# Patient Record
Sex: MALE | URBAN - METROPOLITAN AREA
[De-identification: names, ages, dates, MRNs, and addresses within clinical notes are randomized per-mention and may not be internally consistent; named-entity substitution may affect disease eponyms.]

---

## 2018-01-09 ENCOUNTER — APPOINTMENT (RX ONLY)
Dept: URBAN - METROPOLITAN AREA CLINIC 4 | Facility: CLINIC | Age: 23
Setting detail: DERMATOLOGY
End: 2018-01-09

## 2018-01-09 DIAGNOSIS — D485 NEOPLASM OF UNCERTAIN BEHAVIOR OF SKIN: ICD-10-CM

## 2018-01-09 PROBLEM — D48.5 NEOPLASM OF UNCERTAIN BEHAVIOR OF SKIN: Status: ACTIVE | Noted: 2018-01-09

## 2018-01-09 PROCEDURE — 11100: CPT

## 2018-01-09 PROCEDURE — ? BIOPSY BY SHAVE METHOD

## 2018-01-09 ASSESSMENT — LOCATION SIMPLE DESCRIPTION DERM: LOCATION SIMPLE: RIGHT CHEEK

## 2018-01-09 ASSESSMENT — LOCATION ZONE DERM: LOCATION ZONE: FACE

## 2018-01-09 ASSESSMENT — LOCATION DETAILED DESCRIPTION DERM: LOCATION DETAILED: RIGHT INFERIOR CENTRAL MALAR CHEEK

## 2018-01-09 NOTE — HPI: SKIN LESION
Is This A New Presentation, Or A Follow-Up?: Skin Lesion
How Severe Is Your Skin Lesion?: moderate
Has Your Skin Lesion Been Treated?: not been treated
Additional History: Lesion on right cheek for two months.

## 2018-01-09 NOTE — PROCEDURE: BIOPSY BY SHAVE METHOD
Render Post-Care Instructions In Note?: no
Hemostasis: Aluminum Chloride and Electrocautery
Wound Care: Vaseline
Electrodesiccation Text: The wound bed was treated with electrodesiccation after the biopsy was performed.
Consent: Written consent was obtained and risks were reviewed including but not limited to scarring, infection, bleeding, scabbing, incomplete removal, nerve damage and allergy to anesthesia.
Size Of Lesion In Cm: 0
Post-Care Instructions: I reviewed with the patient in detail post-care instructions. Patient is to keep the biopsy site dry overnight, and then apply bacitracin twice daily until healed. Patient may apply hydrogen peroxide soaks to remove any crusting.
Type Of Destruction Used: Electrodesiccation
Notification Instructions: Patient will be notified of biopsy results. However, patient instructed to call the office if not contacted within 2 weeks.
Curettage Text: The wound bed was treated with curettage after the biopsy was performed.
Detail Level: Detailed
Silver Nitrate Text: The wound bed was treated with silver nitrate after the biopsy was performed.
Electrodesiccation And Curettage Text: The wound bed was treated with electrodesiccation and curettage after the biopsy was performed.
Billing Type: Third-Party Bill
Lab Facility: 
Cryotherapy Text: The wound bed was treated with cryotherapy after the biopsy was performed.
Dressing: bandage
Lab: [id number]
Anesthesia Type: 1% lidocaine with epinephrine
Biopsy Type: H and E
Biopsy Method: Personna blade
Anesthesia Volume In Cc: 2
Destruction After The Procedure: Yes

## 2023-05-06 ENCOUNTER — HOSPITAL ENCOUNTER (EMERGENCY)
Facility: MEDICAL CENTER | Age: 28
End: 2023-05-06
Attending: EMERGENCY MEDICINE
Payer: COMMERCIAL

## 2023-05-06 VITALS
OXYGEN SATURATION: 98 % | HEIGHT: 69 IN | DIASTOLIC BLOOD PRESSURE: 72 MMHG | RESPIRATION RATE: 18 BRPM | WEIGHT: 160.05 LBS | TEMPERATURE: 97.7 F | BODY MASS INDEX: 23.71 KG/M2 | SYSTOLIC BLOOD PRESSURE: 124 MMHG | HEART RATE: 88 BPM

## 2023-05-06 DIAGNOSIS — J02.0 STREP PHARYNGITIS: ICD-10-CM

## 2023-05-06 DIAGNOSIS — E86.0 DEHYDRATION: ICD-10-CM

## 2023-05-06 DIAGNOSIS — J02.9 SORE THROAT: ICD-10-CM

## 2023-05-06 LAB
ALBUMIN SERPL BCP-MCNC: 4.7 G/DL (ref 3.2–4.9)
ALBUMIN/GLOB SERPL: 1.5 G/DL
ALP SERPL-CCNC: 89 U/L (ref 30–99)
ALT SERPL-CCNC: 19 U/L (ref 2–50)
ANION GAP SERPL CALC-SCNC: 15 MMOL/L (ref 7–16)
AST SERPL-CCNC: 21 U/L (ref 12–45)
BASOPHILS # BLD AUTO: 0.4 % (ref 0–1.8)
BASOPHILS # BLD: 0.02 K/UL (ref 0–0.12)
BILIRUB SERPL-MCNC: 0.6 MG/DL (ref 0.1–1.5)
BUN SERPL-MCNC: 16 MG/DL (ref 8–22)
CALCIUM ALBUM COR SERPL-MCNC: 8.8 MG/DL (ref 8.5–10.5)
CALCIUM SERPL-MCNC: 9.4 MG/DL (ref 8.4–10.2)
CHLORIDE SERPL-SCNC: 99 MMOL/L (ref 96–112)
CO2 SERPL-SCNC: 22 MMOL/L (ref 20–33)
CREAT SERPL-MCNC: 0.9 MG/DL (ref 0.5–1.4)
EOSINOPHIL # BLD AUTO: 0.08 K/UL (ref 0–0.51)
EOSINOPHIL NFR BLD: 1.5 % (ref 0–6.9)
ERYTHROCYTE [DISTWIDTH] IN BLOOD BY AUTOMATED COUNT: 37 FL (ref 35.9–50)
FLUAV RNA SPEC QL NAA+PROBE: NEGATIVE
FLUBV RNA SPEC QL NAA+PROBE: NEGATIVE
GFR SERPLBLD CREATININE-BSD FMLA CKD-EPI: 120 ML/MIN/1.73 M 2
GLOBULIN SER CALC-MCNC: 3.1 G/DL (ref 1.9–3.5)
GLUCOSE SERPL-MCNC: 95 MG/DL (ref 65–99)
HCT VFR BLD AUTO: 44.2 % (ref 42–52)
HETEROPH AB SER QL: NEGATIVE
HGB BLD-MCNC: 16.1 G/DL (ref 14–18)
IMM GRANULOCYTES # BLD AUTO: 0.02 K/UL (ref 0–0.11)
IMM GRANULOCYTES NFR BLD AUTO: 0.4 % (ref 0–0.9)
LYMPHOCYTES # BLD AUTO: 1.22 K/UL (ref 1–4.8)
LYMPHOCYTES NFR BLD: 22.7 % (ref 22–41)
MCH RBC QN AUTO: 32.1 PG (ref 27–33)
MCHC RBC AUTO-ENTMCNC: 36.4 G/DL (ref 33.7–35.3)
MCV RBC AUTO: 88 FL (ref 81.4–97.8)
MONOCYTES # BLD AUTO: 0.75 K/UL (ref 0–0.85)
MONOCYTES NFR BLD AUTO: 14 % (ref 0–13.4)
NEUTROPHILS # BLD AUTO: 3.28 K/UL (ref 1.82–7.42)
NEUTROPHILS NFR BLD: 61 % (ref 44–72)
NRBC # BLD AUTO: 0 K/UL
NRBC BLD-RTO: 0 /100 WBC
PLATELET # BLD AUTO: 192 K/UL (ref 164–446)
PMV BLD AUTO: 10.3 FL (ref 9–12.9)
POTASSIUM SERPL-SCNC: 3.5 MMOL/L (ref 3.6–5.5)
PROT SERPL-MCNC: 7.8 G/DL (ref 6–8.2)
RBC # BLD AUTO: 5.02 M/UL (ref 4.7–6.1)
RSV RNA SPEC QL NAA+PROBE: NEGATIVE
S PYO DNA SPEC NAA+PROBE: NOT DETECTED
SARS-COV-2 RNA RESP QL NAA+PROBE: NOTDETECTED
SODIUM SERPL-SCNC: 136 MMOL/L (ref 135–145)
SPECIMEN SOURCE: NORMAL
WBC # BLD AUTO: 5.4 K/UL (ref 4.8–10.8)

## 2023-05-06 PROCEDURE — A9270 NON-COVERED ITEM OR SERVICE: HCPCS | Performed by: EMERGENCY MEDICINE

## 2023-05-06 PROCEDURE — C9803 HOPD COVID-19 SPEC COLLECT: HCPCS | Performed by: EMERGENCY MEDICINE

## 2023-05-06 PROCEDURE — 36415 COLL VENOUS BLD VENIPUNCTURE: CPT

## 2023-05-06 PROCEDURE — 0241U HCHG SARS-COV-2 COVID-19 NFCT DS RESP RNA 4 TRGT MIC: CPT

## 2023-05-06 PROCEDURE — 700102 HCHG RX REV CODE 250 W/ 637 OVERRIDE(OP): Performed by: EMERGENCY MEDICINE

## 2023-05-06 PROCEDURE — 700111 HCHG RX REV CODE 636 W/ 250 OVERRIDE (IP): Performed by: EMERGENCY MEDICINE

## 2023-05-06 PROCEDURE — 80053 COMPREHEN METABOLIC PANEL: CPT

## 2023-05-06 PROCEDURE — 86308 HETEROPHILE ANTIBODY SCREEN: CPT

## 2023-05-06 PROCEDURE — 85025 COMPLETE CBC W/AUTO DIFF WBC: CPT

## 2023-05-06 PROCEDURE — 87651 STREP A DNA AMP PROBE: CPT

## 2023-05-06 PROCEDURE — 99284 EMERGENCY DEPT VISIT MOD MDM: CPT

## 2023-05-06 PROCEDURE — 700105 HCHG RX REV CODE 258: Performed by: EMERGENCY MEDICINE

## 2023-05-06 PROCEDURE — 96375 TX/PRO/DX INJ NEW DRUG ADDON: CPT

## 2023-05-06 PROCEDURE — 96365 THER/PROPH/DIAG IV INF INIT: CPT

## 2023-05-06 RX ORDER — ACETAMINOPHEN 500 MG
500-1000 TABLET ORAL EVERY 6 HOURS PRN
COMMUNITY

## 2023-05-06 RX ORDER — SODIUM CHLORIDE 9 MG/ML
1000 INJECTION, SOLUTION INTRAVENOUS ONCE
Status: DISCONTINUED | OUTPATIENT
Start: 2023-05-06 | End: 2023-05-06 | Stop reason: HOSPADM

## 2023-05-06 RX ORDER — IBUPROFEN 200 MG
200 TABLET ORAL EVERY 6 HOURS PRN
COMMUNITY

## 2023-05-06 RX ORDER — DEXAMETHASONE 4 MG/1
10 TABLET ORAL DAILY
Qty: 13 TABLET | Refills: 0 | Status: SHIPPED | OUTPATIENT
Start: 2023-05-06 | End: 2023-05-11

## 2023-05-06 RX ORDER — KETOROLAC TROMETHAMINE 10 MG/1
10 TABLET, FILM COATED ORAL 3 TIMES DAILY PRN
Qty: 15 TABLET | Refills: 0 | Status: SHIPPED | OUTPATIENT
Start: 2023-05-06

## 2023-05-06 RX ORDER — KETOROLAC TROMETHAMINE 10 MG/1
10 TABLET, FILM COATED ORAL 3 TIMES DAILY PRN
Qty: 15 TABLET | Refills: 0 | Status: SHIPPED | OUTPATIENT
Start: 2023-05-06 | End: 2023-05-06 | Stop reason: SDUPTHER

## 2023-05-06 RX ORDER — AMOXICILLIN AND CLAVULANATE POTASSIUM 875; 125 MG/1; MG/1
1 TABLET, FILM COATED ORAL 2 TIMES DAILY
Qty: 20 TABLET | Refills: 0 | Status: SHIPPED | OUTPATIENT
Start: 2023-05-06 | End: 2023-05-06 | Stop reason: SDUPTHER

## 2023-05-06 RX ORDER — AMOXICILLIN AND CLAVULANATE POTASSIUM 875; 125 MG/1; MG/1
1 TABLET, FILM COATED ORAL 2 TIMES DAILY
Qty: 20 TABLET | Refills: 0 | Status: SHIPPED | OUTPATIENT
Start: 2023-05-06

## 2023-05-06 RX ORDER — OXYCODONE HYDROCHLORIDE AND ACETAMINOPHEN 5; 325 MG/1; MG/1
2 TABLET ORAL ONCE
Status: COMPLETED | OUTPATIENT
Start: 2023-05-06 | End: 2023-05-06

## 2023-05-06 RX ORDER — DEXAMETHASONE 4 MG/1
10 TABLET ORAL DAILY
Qty: 13 TABLET | Refills: 0 | Status: SHIPPED | OUTPATIENT
Start: 2023-05-06 | End: 2023-05-06 | Stop reason: SDUPTHER

## 2023-05-06 RX ORDER — DEXAMETHASONE SODIUM PHOSPHATE 4 MG/ML
10 INJECTION, SOLUTION INTRA-ARTICULAR; INTRALESIONAL; INTRAMUSCULAR; INTRAVENOUS; SOFT TISSUE ONCE
Status: COMPLETED | OUTPATIENT
Start: 2023-05-06 | End: 2023-05-06

## 2023-05-06 RX ORDER — SODIUM CHLORIDE 9 MG/ML
INJECTION, SOLUTION INTRAVENOUS CONTINUOUS
Status: DISCONTINUED | OUTPATIENT
Start: 2023-05-06 | End: 2023-05-06 | Stop reason: HOSPADM

## 2023-05-06 RX ORDER — SODIUM CHLORIDE 9 MG/ML
1000 INJECTION, SOLUTION INTRAVENOUS ONCE
Status: COMPLETED | OUTPATIENT
Start: 2023-05-06 | End: 2023-05-06

## 2023-05-06 RX ORDER — LORATADINE 10 MG/1
10 TABLET ORAL DAILY
COMMUNITY

## 2023-05-06 RX ORDER — KETOROLAC TROMETHAMINE 30 MG/ML
30 INJECTION, SOLUTION INTRAMUSCULAR; INTRAVENOUS ONCE
Status: COMPLETED | OUTPATIENT
Start: 2023-05-06 | End: 2023-05-06

## 2023-05-06 RX ADMIN — OXYCODONE AND ACETAMINOPHEN 2 TABLET: 325; 5 TABLET ORAL at 19:40

## 2023-05-06 RX ADMIN — DEXAMETHASONE SODIUM PHOSPHATE 10 MG: 4 INJECTION, SOLUTION INTRA-ARTICULAR; INTRALESIONAL; INTRAMUSCULAR; INTRAVENOUS; SOFT TISSUE at 17:59

## 2023-05-06 RX ADMIN — AMPICILLIN AND SULBACTAM 3 G: 2; 1 INJECTION, POWDER, FOR SOLUTION INTRAMUSCULAR; INTRAVENOUS at 18:11

## 2023-05-06 RX ADMIN — KETOROLAC TROMETHAMINE 30 MG: 30 INJECTION, SOLUTION INTRAMUSCULAR at 17:59

## 2023-05-06 RX ADMIN — SODIUM CHLORIDE 1000 ML: 9 INJECTION, SOLUTION INTRAVENOUS at 18:09

## 2023-05-06 ASSESSMENT — PAIN DESCRIPTION - PAIN TYPE
TYPE: ACUTE PAIN
TYPE: ACUTE PAIN

## 2023-05-06 NOTE — ED TRIAGE NOTES
[facility] r/o meseret tonsillar abscess. Pt states sore throat x 5 days with left ear pain this am.

## 2023-05-07 NOTE — ED NOTES
PT verbalizes understanding of discharge instructions. Ambulates to lobby with steady gate accompanied by spouse

## 2023-05-07 NOTE — ED NOTES
Pt AO4, sitting up on gurney. Cc consistent with triage note. C/o 7/10 throat pain, difficulty swallowing but able to swallow/manage oral secretions, no c/o breathing difficulties.   PIV established, blood work sent to lab.   Safety precautions in place including gurney locked in lowest position, call light within reach. Pt educated to use call light if requiring any assistance with getting oob.

## 2023-05-07 NOTE — DISCHARGE INSTRUCTIONS
Warm salt water gargles 3-4 times daily, hot tea with lemon and honey  Increase clear liquids especially water or water based products, popsicles, smoothies and anything cold to help soothe your throat.  Take the antibiotics until completely gone.  Steroids with food.  Follow-up with your primary care provider or [facility] [address] within 1 week for recheck and return if any difficulty breathing, swallowing, uncontrolled fever.

## 2023-05-07 NOTE — ED PROVIDER NOTES
ED Provider Note    CHIEF COMPLAINT  Chief Complaint   Patient presents with    Sore Throat     X 5 days    Ear Pain     This am    Sent from Urgent Care     R/o peritonsillar abscess       EXTERNAL RECORDS REVIEWED  Other no available pertinent records    HPI/ROS  LIMITATION TO HISTORY   Select: : None  OUTSIDE HISTORIAN(S):  Significant other wife    Satnam Dowling is a 27 y.o. male who presents tonight with his wife with a chief complaint of severe sore throat for the last 5 days and left ear pain.  Patient went to urgent care today and they were concerned about the possibility of a peritonsillar abscess so they sent him here for further evaluation.  He states he had a low-grade temperature but no shaking chills.  He had 1 episode of nausea with vomiting 5 days ago but that has gone away completely.  He is normally very healthy with no history of diabetes, seizures or asthma.  He has allergies to peanuts only and has not been exposed to any foods with nuts.  He denies any difficulty breathing and is able to swallow his secretions without difficulty.  He has been taking both Tylenol and ibuprofen.    PAST MEDICAL HISTORY   None    SURGICAL HISTORY   has a past surgical history that includes tonsillectomy.    FAMILY HISTORY  History reviewed. No pertinent family history.    SOCIAL HISTORY  Social History     Tobacco Use    Smoking status: Never    Smokeless tobacco: Never   Vaping Use    Vaping Use: Never used   Substance and Sexual Activity    Alcohol use: Yes     Comment: rare    Drug use: Never    Sexual activity: Not on file       CURRENT MEDICATIONS  Home Medications       Reviewed by Carlos Hawkins (Pharmacy Tech) on 05/06/23 at 1705  Med List Status: Complete     Medication Last Dose Status   acetaminophen (TYLENOL) 500 MG Tab 5/6/2023 Active   ibuprofen (MOTRIN) 200 MG Tab 5/6/2023 Active   loratadine (CLARITIN) 10 MG Tab 5/5/2023 Active   multivitamin Tab 5/6/2023 Active               "      ALLERGIES  Allergies   Allergen Reactions    Tree Nuts Food Allergy Anaphylaxis     Peanuts        PHYSICAL EXAM  VITAL SIGNS: /72   Pulse 88   Temp 36.5 °C (97.7 °F) (Temporal)   Resp 18   Ht 1.753 m (5' 9\")   Wt 72.6 kg (160 lb 0.9 oz)   SpO2 98%   BMI 23.64 kg/m²    Constitutional: Patient is well developed, well nourished. Non-toxic appearing.  Moderate distress from his throat pain.  Patient is able to speak full sentences with some guarding.  HENT: Normocephalic,  TM's visualized without erythema. Nose normal with no mucosal edema or drainage. Oropharynx moist with increased erythema, uvular edema and mild left tonsillar recess edema.  There is no exudates.  Eyes: PERRL, EOMI, Conjunctiva without erythema   Neck: Supple with no stridor, positive left anterior cervical lymphadenopathy  Lymphatic: No lymphadenopathy noted.   Cardiovascular: Normal heart rate and Regular rhythm. No murmur  Thorax & Lungs: Clear and equal breath sounds with good excursion. No respiratory distress, no rhonchi, wheezing.  Abdomen: Bowel sounds normal in all four quadrants. Soft,nontender, no rebound , guarding, palpable masses.   Skin: Warm, Dry, No erythema, No rashes   Extremities: Peripheral pulses 4/4 No edema, No tenderness  Musculoskeletal: Normal range of motion in all major joints.   Neurologic: Alert & oriented x 3, Normal motor function, Normal sensory function  Psychiatric: Affect normal, Judgment normal, Mood normal.      DIAGNOSTIC STUDIES / PROCEDURES    LABS  Results for orders placed or performed during the hospital encounter of 05/06/23   CBC WITH DIFFERENTIAL   Result Value Ref Range    WBC 5.4 4.8 - 10.8 K/uL    RBC 5.02 4.70 - 6.10 M/uL    Hemoglobin 16.1 14.0 - 18.0 g/dL    Hematocrit 44.2 42.0 - 52.0 %    MCV 88.0 81.4 - 97.8 fL    MCH 32.1 27.0 - 33.0 pg    MCHC 36.4 (H) 33.7 - 35.3 g/dL    RDW 37.0 35.9 - 50.0 fL    Platelet Count 192 164 - 446 K/uL    MPV 10.3 9.0 - 12.9 fL    " Neutrophils-Polys 61.00 44.00 - 72.00 %    Lymphocytes 22.70 22.00 - 41.00 %    Monocytes 14.00 (H) 0.00 - 13.40 %    Eosinophils 1.50 0.00 - 6.90 %    Basophils 0.40 0.00 - 1.80 %    Immature Granulocytes 0.40 0.00 - 0.90 %    Nucleated RBC 0.00 /100 WBC    Neutrophils (Absolute) 3.28 1.82 - 7.42 K/uL    Lymphs (Absolute) 1.22 1.00 - 4.80 K/uL    Monos (Absolute) 0.75 0.00 - 0.85 K/uL    Eos (Absolute) 0.08 0.00 - 0.51 K/uL    Baso (Absolute) 0.02 0.00 - 0.12 K/uL    Immature Granulocytes (abs) 0.02 0.00 - 0.11 K/uL    NRBC (Absolute) 0.00 K/uL   COMP METABOLIC PANEL   Result Value Ref Range    Sodium 136 135 - 145 mmol/L    Potassium 3.5 (L) 3.6 - 5.5 mmol/L    Chloride 99 96 - 112 mmol/L    Co2 22 20 - 33 mmol/L    Anion Gap 15.0 7.0 - 16.0    Glucose 95 65 - 99 mg/dL    Bun 16 8 - 22 mg/dL    Creatinine 0.90 0.50 - 1.40 mg/dL    Calcium 9.4 8.4 - 10.2 mg/dL    AST(SGOT) 21 12 - 45 U/L    ALT(SGPT) 19 2 - 50 U/L    Alkaline Phosphatase 89 30 - 99 U/L    Total Bilirubin 0.6 0.1 - 1.5 mg/dL    Albumin 4.7 3.2 - 4.9 g/dL    Total Protein 7.8 6.0 - 8.2 g/dL    Globulin 3.1 1.9 - 3.5 g/dL    A-G Ratio 1.5 g/dL   CoV-2, Flu A/B, And RSV by PCR ([employer] [facility] [facility])    Specimen: Nasopharyngeal; Respirate   Result Value Ref Range    Influenza virus A RNA Negative Negative    Influenza virus B, PCR Negative Negative    RSV, PCR Negative Negative    SARS-CoV-2 by PCR NotDetected     SARS-CoV-2 Source NP Swab    Group A Strep by PCR    Specimen: Throat   Result Value Ref Range    Group A Strep by PCR Not Detected Not Detected   CORRECTED CALCIUM   Result Value Ref Range    Correct Calcium 8.8 8.5 - 10.5 mg/dL   ESTIMATED GFR   Result Value Ref Range    GFR (CKD-EPI) 120 >60 mL/min/1.73 m 2   MONONUCLEOSIS TEST QUAL   Result Value Ref Range    Heterophile Screen Negative Negative       COURSE & MEDICAL DECISION MAKING    ED Observation Status? Yes; I am placing the patient in to an observation status due to a diagnostic uncertainty as  well as therapeutic intensity. Patient placed in observation status at 16:40 PM, 5/6/2023.     Observation plan is as follows: IV fluids, pain control, Decadron, laboratories, viral swabs, strep swab, Monospot    Upon Reevaluation, the patient's condition has: Improved; and will be discharged.    Patient discharged from ED Observation status at 19:15 (Time) 5/6/23 (Date).     INITIAL ASSESSMENT, COURSE AND PLAN  Care Narrative: Patient received an IV of normal saline with fluid bolus, Decadron, Toradol, Unasyn.  His laboratories showed a normal white blood cell count with a stable H&H.  He does have increased monocytes of 14% potassium slightly low at 3.5.  Monospot was negative, group A strep was negative, viral swabbing was negative.  He is improved upon recheck and is able to speak better he states his pain is about a 2 out of 10 at this point.  I gave him a fluid challenge with a popsicle which she appreciated and is feeling much better.  At this point I still believe he likely has strep and will be sent home with a prescription for Augmentin, Decadron and Toradol.  He is to do warm salt water gargles, increase fluids especially cold items i.e. popsicles, smoothies etc. to help soothe his throat he is to follow-up with his primary care provider within the week for recheck and return if any problems or worsening.  He is discharged in stable and improved condition.  HYDRATION: Based on the patient's presentation of Inability to take oral fluids the patient was given IV fluids. IV Hydration was used because oral hydration was not adequate alone. Upon recheck following hydration, the patient was markedly improved..        DISPOSITION AND DISCUSSIONS  I have discussed management of the patient with the following physicians and GREG's: None    Discussion of management with other QHP or appropriate source(s): None     Escalation of care considered, and ultimately not performed:acute inpatient care management, however at  this time, the patient is most appropriate for outpatient management    Barriers to care at this time, including but not limited to: Patient does not have established PCP.     Decision tools and prescription drugs considered including, but not limited to: Antibiotics Augmentin, Pain Medications Toradol, and Decadron .    FINAL DIAGNOSIS  1. Strep pharyngitis    2. Sore throat    3. Dehydration           Electronically signed by: Ekaterina Alvarez D.O., 5/6/2023 7:11 PM

## 2024-11-16 ENCOUNTER — OFFICE VISIT (OUTPATIENT)
Dept: URGENT CARE | Facility: PHYSICIAN GROUP | Age: 29
End: 2024-11-16
Payer: COMMERCIAL

## 2024-11-16 VITALS
HEIGHT: 69 IN | DIASTOLIC BLOOD PRESSURE: 84 MMHG | OXYGEN SATURATION: 98 % | RESPIRATION RATE: 20 BRPM | HEART RATE: 118 BPM | TEMPERATURE: 98 F | SYSTOLIC BLOOD PRESSURE: 138 MMHG | BODY MASS INDEX: 23.89 KG/M2 | WEIGHT: 161.3 LBS

## 2024-11-16 DIAGNOSIS — B34.9 NONSPECIFIC SYNDROME SUGGESTIVE OF VIRAL ILLNESS: ICD-10-CM

## 2024-11-16 DIAGNOSIS — Z75.8 DOES NOT HAVE PRIMARY CARE PROVIDER: ICD-10-CM

## 2024-11-16 LAB
FLUAV RNA SPEC QL NAA+PROBE: NEGATIVE
FLUBV RNA SPEC QL NAA+PROBE: NEGATIVE
RSV RNA SPEC QL NAA+PROBE: NEGATIVE
SARS-COV-2 RNA RESP QL NAA+PROBE: NEGATIVE

## 2024-11-16 PROCEDURE — 3079F DIAST BP 80-89 MM HG: CPT

## 2024-11-16 PROCEDURE — 0241U POCT CEPHEID COV-2, FLU A/B, RSV - PCR: CPT

## 2024-11-16 PROCEDURE — 99204 OFFICE O/P NEW MOD 45 MIN: CPT

## 2024-11-16 PROCEDURE — 3075F SYST BP GE 130 - 139MM HG: CPT

## 2024-11-16 ASSESSMENT — FIBROSIS 4 INDEX: FIB4 SCORE: 0.73

## 2024-11-16 NOTE — PROGRESS NOTES
CHIEF COMPLAINT  Chief Complaint   Patient presents with    Fever     Cough, sore throat x 4 days     Subjective:   Satnam Dowling is a 29 y.o. male who presents to urgent care with concerns for fever, cough and sore throat x 4 days.  Patient reports symptoms of sore throat has resolved over the last several days.  He reports fever of 103 at illness onset, which is also resolved.  He now reports symptoms of persistent dry cough.  Denies any shortness of breath.  Denies any symptoms of vomiting or diarrhea, but does endorse mild nausea.  Reports adequate oral intake.  Patient states he has been using OTC analgesics for alleviation of discomfort.  No other pertinent past medical history.        PAST MEDICAL HISTORY  There are no active problems to display for this patient.      SURGICAL HISTORY   has a past surgical history that includes tonsillectomy.    ALLERGIES  Allergies   Allergen Reactions    Tree Nuts Food Allergy Anaphylaxis     Peanuts        CURRENT MEDICATIONS  Home Medications       Reviewed by Toribio Santamaria Ass't (Medical Assistant) on 11/16/24 at 1501  Med List Status: <None>     Medication Last Dose Status   acetaminophen (TYLENOL) 500 MG Tab Not Taking Active   amoxicillin-clavulanate (AUGMENTIN) 875-125 MG Tab Not Taking Active   ibuprofen (MOTRIN) 200 MG Tab Not Taking Active   ketorolac (TORADOL) 10 MG Tab Not Taking Active   loratadine (CLARITIN) 10 MG Tab Not Taking Active   multivitamin Tab Not Taking Active                    SOCIAL HISTORY  Social History     Tobacco Use    Smoking status: Never    Smokeless tobacco: Never   Vaping Use    Vaping status: Never Used   Substance and Sexual Activity    Alcohol use: Yes     Comment: rare    Drug use: Never    Sexual activity: Not on file       FAMILY HISTORY  No family history on file.      Medications, Allergies, and current problem list reviewed today in Epic.     Objective:     /84   Pulse (!) 118   Temp 36.7 °C (98  "°F)   Resp 20   Ht 1.753 m (5' 9\")   Wt 73.2 kg (161 lb 4.8 oz)   SpO2 98%     Physical Exam  Vitals reviewed.   Constitutional:       General: He is not in acute distress.     Appearance: Normal appearance. He is normal weight. He is not ill-appearing or toxic-appearing.   HENT:      Head: Normocephalic.      Right Ear: Tympanic membrane normal.      Left Ear: Tympanic membrane normal.      Nose: Congestion present.      Mouth/Throat:      Mouth: Mucous membranes are moist.      Pharynx: Oropharynx is clear. Uvula midline. No oropharyngeal exudate or posterior oropharyngeal erythema.      Tonsils: 1+ on the right. 1+ on the left.   Cardiovascular:      Rate and Rhythm: Normal rate and regular rhythm.      Pulses: Normal pulses.      Heart sounds: Normal heart sounds.   Pulmonary:      Effort: Pulmonary effort is normal. No respiratory distress.      Breath sounds: Normal breath sounds. No stridor. No wheezing, rhonchi or rales.   Musculoskeletal:      Cervical back: Normal range of motion and neck supple.   Skin:     General: Skin is warm.      Capillary Refill: Capillary refill takes less than 2 seconds.   Neurological:      General: No focal deficit present.      Mental Status: He is alert.   Psychiatric:         Mood and Affect: Mood normal.         Lab Results/POC Test Results   Results for orders placed or performed in visit on 11/16/24   POCT CoV-2, Flu A/B, RSV by PCR    Collection Time: 11/16/24  3:33 PM   Result Value Ref Range    SARS-CoV-2 by PCR Negative Negative, Invalid    Influenza virus A RNA Negative Negative, Invalid    Influenza virus B, PCR Negative Negative, Invalid    RSV, PCR Negative Negative, Invalid          Assessment/Plan:     Diagnosis and associated orders:     1. Nonspecific syndrome suggestive of viral illness  POCT CoV-2, Flu A/B, RSV by PCR      2. Does not have primary care provider  Referral to establish with PCP         Comments/MDM:     Patient reports with 4-day history of " cough, sore throat and fever.  He does report resolution of fever and sore throat.  Denies any shortness of breath.  Upon physical exam patient is alert apparent signs of distress.  He is clear to auscultation bilaterally.  No crackles, rhonchi or wheezes appreciated.  Normal respiratory effort.  Vital signs are stable in clinic.  Discussed likely viral etiology of symptoms.  Recommended symptomatic and supportive care at this time that includes plenty of fluids, rest, Tylenol/Ibuprofen for pain/fever, warm salt water gargles for sore throat, OTC cough and decongestant medication, Flonase, nasal saline washes.    Return to clinic if symptoms worsen or fail to resolve.         Differential diagnosis, natural history, supportive care, and indications for immediate follow-up discussed.    Advised the patient to follow-up with the primary care physician for recheck, reevaluation, and consideration of further management.    Please note that this dictation was created using voice recognition software. I have made a reasonable attempt to correct obvious errors, but I expect that there are errors of grammar and possibly content that I did not discover before finalizing the note.    This note was electronically signed by MANDI Delgado

## 2024-12-26 ENCOUNTER — OFFICE VISIT (OUTPATIENT)
Dept: URGENT CARE | Facility: PHYSICIAN GROUP | Age: 29
End: 2024-12-26
Payer: COMMERCIAL

## 2024-12-26 VITALS
HEART RATE: 78 BPM | OXYGEN SATURATION: 98 % | BODY MASS INDEX: 23.12 KG/M2 | HEIGHT: 70 IN | RESPIRATION RATE: 19 BRPM | TEMPERATURE: 97.5 F | DIASTOLIC BLOOD PRESSURE: 78 MMHG | WEIGHT: 161.5 LBS | SYSTOLIC BLOOD PRESSURE: 114 MMHG

## 2024-12-26 DIAGNOSIS — K13.70 ORAL LESION: ICD-10-CM

## 2024-12-26 DIAGNOSIS — L08.9 INFECTED LESION OF SKIN: ICD-10-CM

## 2024-12-26 PROCEDURE — 3074F SYST BP LT 130 MM HG: CPT

## 2024-12-26 PROCEDURE — 99213 OFFICE O/P EST LOW 20 MIN: CPT

## 2024-12-26 PROCEDURE — 3078F DIAST BP <80 MM HG: CPT

## 2024-12-26 ASSESSMENT — FIBROSIS 4 INDEX: FIB4 SCORE: 0.73

## 2024-12-26 NOTE — PROGRESS NOTES
"Subjective:   Satnam Dowling is a 29 y.o. male who presents for Pharyngitis (2 weeks)      HPI:    Patient presents to urgent care with concerns of sore throat x 2 weeks  Had HFMD prior to the onset of his symptoms. States his daughter who is one year old had it prior to the onset of his lesions.   States he has a sore in his throat which he thinks is starting to get infected  Denies fever, chills. But has a worsening sore throat, redness, and swelling of the left side of his throat.   No n/v, tolerating softer food and liquids.  Pain is mildly controlled with IBU and Tylenol        ROS As above in HPI    Medications:    No current outpatient medications on file prior to visit.     No current facility-administered medications on file prior to visit.        Allergies:   Peanut-derived and Tree nuts food allergy    Problem List:   There is no problem list on file for this patient.       Surgical History:  Past Surgical History:   Procedure Laterality Date    TONSILLECTOMY         Past Social Hx:   Social History     Tobacco Use    Smoking status: Never    Smokeless tobacco: Never   Vaping Use    Vaping status: Never Used   Substance Use Topics    Alcohol use: Yes     Comment: rare    Drug use: Never          Problem list, medications, and allergies reviewed by myself today in Epic.     Objective:     /78   Pulse 78   Temp 36.4 °C (97.5 °F) (Temporal)   Resp 19   Ht 1.775 m (5' 9.88\")   Wt 73.3 kg (161 lb 8 oz)   SpO2 98%   BMI 23.25 kg/m²     Physical Exam  Vitals and nursing note reviewed.   Constitutional:       General: He is not in acute distress.     Appearance: Normal appearance. He is not ill-appearing.   HENT:      Head: Normocephalic.      Right Ear: Tympanic membrane and ear canal normal.      Left Ear: Tympanic membrane and ear canal normal.      Nose: Nose normal.      Mouth/Throat:      Palate: Lesions present.      Pharynx: Pharyngeal swelling, oropharyngeal exudate and posterior " oropharyngeal erythema present. No uvula swelling.      Tonsils: No tonsillar abscesses. 0 on the left.        Comments: Large ulceration on the left soft palate. There is exudate present. There is redness around the ulcer. There is TTP.  Neurological:      Mental Status: He is alert.         Assessment/Plan:       Diagnosis and associated orders:   1. Infected lesion of skin  - amoxicillin-clavulanate (AUGMENTIN) 875-125 MG Tab; Take 1 Tablet by mouth 2 times a day for 10 days.  Dispense: 20 Tablet; Refill: 0    2. Oral lesion      Comments/MDM:       There is a large ulcer on the left hard palate.  Which has been present for 2 weeks since he developed hand-foot-and-mouth disease.  Patient has previous medical history of tonsillectomy.  There were concerning signs for cellulitic changes: Exudate, swelling, erythema.  No signs of airway compromise.  No signs of respiratory distress.  Declined clotrimazole lozenges, prednisone, lidocaine. Follow up with PCP advised. Return to UC should symptoms persist. Strict return to ER for worsening signs of infection, recurrence of fever, inability to tolerate oral intake.     Return to clinic or go to ED if symptoms worsen or persist. Indications for ED discussed at length. Patient/Parent/Guardian voices understanding. Follow-up with your primary care provider in 3-5 days. Red flag symptoms discussed. All side effects of medication discussed including allergic response, GI upset, tendon injury, rash, sedation etc.    Please note that this dictation was created using voice recognition software. I have made a reasonable attempt to correct obvious errors, but I expect that there are errors of grammar and possibly content that I did not discover before finalizing the note.    This note was electronically signed by LOU Ng

## 2025-01-24 ENCOUNTER — APPOINTMENT (OUTPATIENT)
Dept: MEDICAL GROUP | Facility: PHYSICIAN GROUP | Age: 30
End: 2025-01-24
Payer: COMMERCIAL

## 2025-01-24 VITALS
OXYGEN SATURATION: 97 % | HEIGHT: 70 IN | WEIGHT: 159 LBS | HEART RATE: 107 BPM | SYSTOLIC BLOOD PRESSURE: 124 MMHG | BODY MASS INDEX: 22.76 KG/M2 | DIASTOLIC BLOOD PRESSURE: 80 MMHG | TEMPERATURE: 97.1 F

## 2025-01-24 DIAGNOSIS — Z86.19 FREQUENT INFECTIONS: ICD-10-CM

## 2025-01-24 DIAGNOSIS — Z11.4 ENCOUNTER FOR SCREENING FOR HUMAN IMMUNODEFICIENCY VIRUS (HIV): ICD-10-CM

## 2025-01-24 DIAGNOSIS — R50.9 FEVER, UNKNOWN ORIGIN: ICD-10-CM

## 2025-01-24 DIAGNOSIS — Z11.59 NEED FOR HEPATITIS C SCREENING TEST: ICD-10-CM

## 2025-01-24 DIAGNOSIS — L98.9 SKIN LESION: ICD-10-CM

## 2025-01-24 PROCEDURE — 3079F DIAST BP 80-89 MM HG: CPT | Performed by: STUDENT IN AN ORGANIZED HEALTH CARE EDUCATION/TRAINING PROGRAM

## 2025-01-24 PROCEDURE — 3074F SYST BP LT 130 MM HG: CPT | Performed by: STUDENT IN AN ORGANIZED HEALTH CARE EDUCATION/TRAINING PROGRAM

## 2025-01-24 PROCEDURE — 99214 OFFICE O/P EST MOD 30 MIN: CPT | Performed by: STUDENT IN AN ORGANIZED HEALTH CARE EDUCATION/TRAINING PROGRAM

## 2025-01-24 ASSESSMENT — FIBROSIS 4 INDEX: FIB4 SCORE: 0.73

## 2025-01-24 ASSESSMENT — PATIENT HEALTH QUESTIONNAIRE - PHQ9
CLINICAL INTERPRETATION OF PHQ2 SCORE: 1
SUM OF ALL RESPONSES TO PHQ QUESTIONS 1-9: 2
5. POOR APPETITE OR OVEREATING: 0 - NOT AT ALL

## 2025-01-24 NOTE — ASSESSMENT & PLAN NOTE
Very interesting course of frequent infections but also frequent episodes of fever of unknown etiology that does not seem to correlate to any symptoms at all.  That he has been having this for about 6 to 7 years and will get episodes of either asymptomatic fevers or acute illnesses as frequent as every couple weeks but consistently throughout the year and without specific infections was concerning for possible chronic infection, immune deficiency of some sort, autoimmune disease, or inflammatory disease  Labs per orders to cast a broad evaluation to see if we can find any particular reason this happening  If all labs are normal might be worthwhile repeating when he is in the middle of an episode, discussed this with Gael and he is understanding  Follow-up in about a month after labs are performed or sooner as needed if he has recurrent episode before then    Orders:    CBC WITH DIFFERENTIAL; Future    Comp Metabolic Panel; Future    MICHELLE W/REFLEX IF POSITIVE    RHEUMATOID ARTHRITIS FACTOR; Future    Sed Rate; Future    CRP QUANTITIVE (NON-CARDIAC); Future    FERRITIN; Future

## 2025-01-24 NOTE — ASSESSMENT & PLAN NOTE
Solitary approximately 1 cm red skin lesion on right upper inner thigh.  No dryness or tenderness.  No specific concerning signs on exam, at this point I do not think biopsy is necessary  Advised monitoring it and measuring it and seeing if it changes size, shape, color over time and if it does we can biopsy  Also recommended if pruritic or dry can trial steroid cream over-the-counter to see if that helps resolve the lesion  Recommend follow-up if changing

## 2025-01-24 NOTE — ASSESSMENT & PLAN NOTE
See frequent infections.  Episodes of fever persist even when he has no other signs or symptoms of infection    Orders:    CBC WITH DIFFERENTIAL; Future    Comp Metabolic Panel; Future    MICHELLE W/REFLEX IF POSITIVE    RHEUMATOID ARTHRITIS FACTOR; Future    Sed Rate; Future    CRP QUANTITIVE (NON-CARDIAC); Future    FERRITIN; Future

## 2025-01-24 NOTE — PROGRESS NOTES
"Subjective:     CC:    Chief Complaint   Patient presents with    Establish Care     Labs needed  Spot on R leg, x 6 years  Pt states getting consistently sick         HISTORY OF THE PRESENT ILLNESS: Patient is a 29 y.o. male. This pleasant patient is here today to establish care and discuss the following. Prior PCP was none.    Problem   Frequent Infections    Concerned for frequent infections and fevers.  Recently started again in December had a fever up to 103* F, then got hand foot and mouth from daughter, after that a sore in his throat got infected.  Recovered from that then had URI 3 weeks ago that resolved, then again about 1 week ago.  Sometimes will have fever with no other symptoms for about the last 6-7 years.  During these episodes will be >101* F, and these episodes last about 2-3 days.  Takes temperatures with the forehead laser thermometers or in mouth under tongue.     Sister, cousin and aunt with autoimmune issues, no known immune deficiency in family history.     Fever, Unknown Origin    See frequent infections.  Episodes of fever persist even when he has no other signs or symptoms of infection     Skin Lesion    Concerned about red spot on his right thigh.  Does not bother him too much, not itchy.  He is unsure if this has been present since childhood or if this is a new lesion.  Since he noticed it a couple weeks ago has not noticed any growth or changes.             Health Maintenance: Completed    ROS:   No notable symptoms reported other than those noted in HPI      Objective:       Exam: /80 (BP Location: Right arm, Patient Position: Sitting, BP Cuff Size: Adult)   Pulse (!) 107   Temp 36.2 °C (97.1 °F) (Temporal)   Ht 1.77 m (5' 9.69\")   Wt 72.1 kg (159 lb)   SpO2 97%  Body mass index is 23.02 kg/m².    Physical Exam  Constitutional:       General: He is not in acute distress.     Appearance: Normal appearance. He is not ill-appearing.   HENT:      Head: Normocephalic and " atraumatic.      Right Ear: Tympanic membrane, ear canal and external ear normal. There is no impacted cerumen.      Left Ear: Tympanic membrane, ear canal and external ear normal. There is no impacted cerumen.      Nose: Nose normal.      Mouth/Throat:      Mouth: Mucous membranes are moist.      Pharynx: Oropharynx is clear.   Eyes:      Extraocular Movements: Extraocular movements intact.      Conjunctiva/sclera: Conjunctivae normal.   Cardiovascular:      Rate and Rhythm: Normal rate and regular rhythm.      Pulses: Normal pulses.      Heart sounds: Normal heart sounds. No murmur heard.  Pulmonary:      Effort: Pulmonary effort is normal. No respiratory distress.      Breath sounds: Normal breath sounds. No wheezing, rhonchi or rales.   Abdominal:      General: Abdomen is flat. Bowel sounds are normal. There is no distension.      Palpations: Abdomen is soft. There is no mass.      Tenderness: There is no abdominal tenderness.   Musculoskeletal:         General: No swelling, deformity or signs of injury. Normal range of motion.      Cervical back: Normal range of motion and neck supple.   Lymphadenopathy:      Cervical: No cervical adenopathy.   Skin:     General: Skin is warm and dry.      Findings: Lesion (SSolitary approximately 1 cm diameter red lesion on right upper inner thigh, no dryness, color is uniform, patch is symmetric, not raised.) present. No rash.   Neurological:      General: No focal deficit present.      Mental Status: He is alert and oriented to person, place, and time.           Assessment & Plan:   29 y.o. male with the following -    Assessment & Plan  Frequent infections  Very interesting course of frequent infections but also frequent episodes of fever of unknown etiology that does not seem to correlate to any symptoms at all.  That he has been having this for about 6 to 7 years and will get episodes of either asymptomatic fevers or acute illnesses as frequent as every couple weeks but  consistently throughout the year and without specific infections was concerning for possible chronic infection, immune deficiency of some sort, autoimmune disease, or inflammatory disease  Labs per orders to cast a broad evaluation to see if we can find any particular reason this happening  If all labs are normal might be worthwhile repeating when he is in the middle of an episode, discussed this with Gael and he is understanding  Follow-up in about a month after labs are performed or sooner as needed if he has recurrent episode before then    Orders:    CBC WITH DIFFERENTIAL; Future    Comp Metabolic Panel; Future    MICHELLE W/REFLEX IF POSITIVE    RHEUMATOID ARTHRITIS FACTOR; Future    Sed Rate; Future    CRP QUANTITIVE (NON-CARDIAC); Future    FERRITIN; Future    Fever, unknown origin  See frequent infections.  Episodes of fever persist even when he has no other signs or symptoms of infection    Orders:    CBC WITH DIFFERENTIAL; Future    Comp Metabolic Panel; Future    MICHELLE W/REFLEX IF POSITIVE    RHEUMATOID ARTHRITIS FACTOR; Future    Sed Rate; Future    CRP QUANTITIVE (NON-CARDIAC); Future    FERRITIN; Future    Skin lesion  Solitary approximately 1 cm red skin lesion on right upper inner thigh.  No dryness or tenderness.  No specific concerning signs on exam, at this point I do not think biopsy is necessary  Advised monitoring it and measuring it and seeing if it changes size, shape, color over time and if it does we can biopsy  Also recommended if pruritic or dry can trial steroid cream over-the-counter to see if that helps resolve the lesion  Recommend follow-up if changing         Need for hepatitis C screening test    Orders:    HEP C VIRUS ANTIBODY; Future    Encounter for screening for human immunodeficiency virus (HIV)    Orders:    HIV AG/AB COMBO ASSAY SCREENING; Future          Return in about 4 weeks (around 2/21/2025).

## 2025-01-27 ENCOUNTER — HOSPITAL ENCOUNTER (OUTPATIENT)
Dept: LAB | Facility: MEDICAL CENTER | Age: 30
End: 2025-01-27
Attending: STUDENT IN AN ORGANIZED HEALTH CARE EDUCATION/TRAINING PROGRAM
Payer: COMMERCIAL

## 2025-01-27 DIAGNOSIS — Z11.59 NEED FOR HEPATITIS C SCREENING TEST: ICD-10-CM

## 2025-01-27 DIAGNOSIS — R50.9 FEVER, UNKNOWN ORIGIN: ICD-10-CM

## 2025-01-27 DIAGNOSIS — Z86.19 FREQUENT INFECTIONS: ICD-10-CM

## 2025-01-27 DIAGNOSIS — Z11.4 ENCOUNTER FOR SCREENING FOR HUMAN IMMUNODEFICIENCY VIRUS (HIV): ICD-10-CM

## 2025-01-27 LAB
ALBUMIN SERPL BCP-MCNC: 4.9 G/DL (ref 3.2–4.9)
ALBUMIN/GLOB SERPL: 1.3 G/DL
ALP SERPL-CCNC: 90 U/L (ref 30–99)
ALT SERPL-CCNC: 17 U/L (ref 2–50)
ANION GAP SERPL CALC-SCNC: 12 MMOL/L (ref 7–16)
AST SERPL-CCNC: 23 U/L (ref 12–45)
BASOPHILS # BLD AUTO: 0.6 % (ref 0–1.8)
BASOPHILS # BLD: 0.05 K/UL (ref 0–0.12)
BILIRUB SERPL-MCNC: 0.4 MG/DL (ref 0.1–1.5)
BUN SERPL-MCNC: 17 MG/DL (ref 8–22)
CALCIUM ALBUM COR SERPL-MCNC: 9.3 MG/DL (ref 8.5–10.5)
CALCIUM SERPL-MCNC: 10 MG/DL (ref 8.5–10.5)
CHLORIDE SERPL-SCNC: 103 MMOL/L (ref 96–112)
CO2 SERPL-SCNC: 27 MMOL/L (ref 20–33)
CREAT SERPL-MCNC: 1 MG/DL (ref 0.5–1.4)
CRP SERPL HS-MCNC: 0.77 MG/DL (ref 0–0.75)
EOSINOPHIL # BLD AUTO: 0.64 K/UL (ref 0–0.51)
EOSINOPHIL NFR BLD: 8.2 % (ref 0–6.9)
ERYTHROCYTE [DISTWIDTH] IN BLOOD BY AUTOMATED COUNT: 39.7 FL (ref 35.9–50)
ERYTHROCYTE [SEDIMENTATION RATE] IN BLOOD BY WESTERGREN METHOD: 10 MM/HOUR (ref 0–20)
FERRITIN SERPL-MCNC: 665 NG/ML (ref 22–322)
GFR SERPLBLD CREATININE-BSD FMLA CKD-EPI: 104 ML/MIN/1.73 M 2
GLOBULIN SER CALC-MCNC: 3.7 G/DL (ref 1.9–3.5)
GLUCOSE SERPL-MCNC: 87 MG/DL (ref 65–99)
HCT VFR BLD AUTO: 47.1 % (ref 42–52)
HCV AB SER QL: NORMAL
HGB BLD-MCNC: 16.3 G/DL (ref 14–18)
HIV 1+2 AB+HIV1 P24 AG SERPL QL IA: NORMAL
IMM GRANULOCYTES # BLD AUTO: 0.03 K/UL (ref 0–0.11)
IMM GRANULOCYTES NFR BLD AUTO: 0.4 % (ref 0–0.9)
LYMPHOCYTES # BLD AUTO: 2.79 K/UL (ref 1–4.8)
LYMPHOCYTES NFR BLD: 35.5 % (ref 22–41)
MCH RBC QN AUTO: 31 PG (ref 27–33)
MCHC RBC AUTO-ENTMCNC: 34.6 G/DL (ref 32.3–36.5)
MCV RBC AUTO: 89.7 FL (ref 81.4–97.8)
MONOCYTES # BLD AUTO: 0.51 K/UL (ref 0–0.85)
MONOCYTES NFR BLD AUTO: 6.5 % (ref 0–13.4)
NEUTROPHILS # BLD AUTO: 3.83 K/UL (ref 1.82–7.42)
NEUTROPHILS NFR BLD: 48.8 % (ref 44–72)
NRBC # BLD AUTO: 0 K/UL
NRBC BLD-RTO: 0 /100 WBC (ref 0–0.2)
PLATELET # BLD AUTO: 282 K/UL (ref 164–446)
PMV BLD AUTO: 9.8 FL (ref 9–12.9)
POTASSIUM SERPL-SCNC: 4.2 MMOL/L (ref 3.6–5.5)
PROT SERPL-MCNC: 8.6 G/DL (ref 6–8.2)
RBC # BLD AUTO: 5.25 M/UL (ref 4.7–6.1)
RHEUMATOID FACT SER IA-ACNC: <10 IU/ML (ref 0–14)
SODIUM SERPL-SCNC: 142 MMOL/L (ref 135–145)
WBC # BLD AUTO: 7.9 K/UL (ref 4.8–10.8)

## 2025-01-27 PROCEDURE — 85025 COMPLETE CBC W/AUTO DIFF WBC: CPT

## 2025-01-27 PROCEDURE — 86431 RHEUMATOID FACTOR QUANT: CPT

## 2025-01-27 PROCEDURE — 87389 HIV-1 AG W/HIV-1&-2 AB AG IA: CPT

## 2025-01-27 PROCEDURE — 85652 RBC SED RATE AUTOMATED: CPT

## 2025-01-27 PROCEDURE — 86803 HEPATITIS C AB TEST: CPT

## 2025-01-27 PROCEDURE — 86140 C-REACTIVE PROTEIN: CPT

## 2025-01-27 PROCEDURE — 86038 ANTINUCLEAR ANTIBODIES: CPT

## 2025-01-27 PROCEDURE — 80053 COMPREHEN METABOLIC PANEL: CPT

## 2025-01-27 PROCEDURE — 82728 ASSAY OF FERRITIN: CPT

## 2025-01-27 PROCEDURE — 36415 COLL VENOUS BLD VENIPUNCTURE: CPT

## 2025-01-29 LAB — NUCLEAR IGG SER QL IA: NORMAL

## 2025-02-11 ENCOUNTER — HOSPITAL ENCOUNTER (OUTPATIENT)
Dept: LAB | Facility: MEDICAL CENTER | Age: 30
End: 2025-02-11
Attending: STUDENT IN AN ORGANIZED HEALTH CARE EDUCATION/TRAINING PROGRAM
Payer: COMMERCIAL

## 2025-02-11 DIAGNOSIS — R79.89 ELEVATED FERRITIN: ICD-10-CM

## 2025-02-11 DIAGNOSIS — R77.9 ELEVATED BLOOD PROTEIN: ICD-10-CM

## 2025-02-11 DIAGNOSIS — Z86.19 FREQUENT INFECTIONS: ICD-10-CM

## 2025-02-11 DIAGNOSIS — R50.9 FEVER, UNKNOWN ORIGIN: ICD-10-CM

## 2025-02-11 LAB
APPEARANCE UR: CLEAR
BILIRUB UR QL STRIP.AUTO: NEGATIVE
COLOR UR: YELLOW
FERRITIN SERPL-MCNC: 568 NG/ML (ref 22–322)
GLUCOSE UR STRIP.AUTO-MCNC: NEGATIVE MG/DL
IRON SATN MFR SERPL: 47 % (ref 15–55)
IRON SERPL-MCNC: 124 UG/DL (ref 50–180)
KETONES UR STRIP.AUTO-MCNC: NEGATIVE MG/DL
LEUKOCYTE ESTERASE UR QL STRIP.AUTO: NEGATIVE
MICRO URNS: NORMAL
NITRITE UR QL STRIP.AUTO: NEGATIVE
PH UR STRIP.AUTO: 6.5 [PH] (ref 5–8)
PROT UR QL STRIP: NEGATIVE MG/DL
RBC UR QL AUTO: NEGATIVE
SP GR UR STRIP.AUTO: 1.01
TIBC SERPL-MCNC: 263 UG/DL (ref 250–450)
UIBC SERPL-MCNC: 139 UG/DL (ref 110–370)
UROBILINOGEN UR STRIP.AUTO-MCNC: 0.2 EU/DL

## 2025-02-11 PROCEDURE — 81003 URINALYSIS AUTO W/O SCOPE: CPT

## 2025-02-11 PROCEDURE — 83540 ASSAY OF IRON: CPT

## 2025-02-11 PROCEDURE — 84165 PROTEIN E-PHORESIS SERUM: CPT

## 2025-02-11 PROCEDURE — 82728 ASSAY OF FERRITIN: CPT

## 2025-02-11 PROCEDURE — 36415 COLL VENOUS BLD VENIPUNCTURE: CPT

## 2025-02-11 PROCEDURE — 84155 ASSAY OF PROTEIN SERUM: CPT

## 2025-02-11 PROCEDURE — 83550 IRON BINDING TEST: CPT

## 2025-02-13 ENCOUNTER — RESULTS FOLLOW-UP (OUTPATIENT)
Dept: MEDICAL GROUP | Facility: CLINIC | Age: 30
End: 2025-02-13

## 2025-02-15 LAB
ALBUMIN SERPL ELPH-MCNC: 5 G/DL (ref 3.75–5.01)
ALPHA1 GLOB SERPL ELPH-MCNC: 0.3 G/DL (ref 0.19–0.46)
ALPHA2 GLOB SERPL ELPH-MCNC: 0.67 G/DL (ref 0.48–1.05)
B-GLOBULIN SERPL ELPH-MCNC: 0.95 G/DL (ref 0.48–1.1)
GAMMA GLOB SERPL ELPH-MCNC: 1.28 G/DL (ref 0.62–1.51)
INTERPRETATION SERPL IFE-IMP: NORMAL
MONOCLON BAND OBS SERPL: NORMAL
MONOCLONAL PROTEIN NL11656: NORMAL G/DL
PATHOLOGY STUDY: NORMAL
PROT SERPL-MCNC: 8.2 G/DL (ref 6.3–8.2)

## 2025-02-21 ENCOUNTER — OFFICE VISIT (OUTPATIENT)
Dept: MEDICAL GROUP | Facility: PHYSICIAN GROUP | Age: 30
End: 2025-02-21
Payer: COMMERCIAL

## 2025-02-21 VITALS
WEIGHT: 162.04 LBS | SYSTOLIC BLOOD PRESSURE: 110 MMHG | BODY MASS INDEX: 24 KG/M2 | TEMPERATURE: 98 F | OXYGEN SATURATION: 97 % | DIASTOLIC BLOOD PRESSURE: 60 MMHG | HEIGHT: 69 IN | HEART RATE: 84 BPM

## 2025-02-21 DIAGNOSIS — R50.9 FEVER, UNKNOWN ORIGIN: ICD-10-CM

## 2025-02-21 DIAGNOSIS — R77.8 ELEVATED TOTAL PROTEIN: ICD-10-CM

## 2025-02-21 DIAGNOSIS — H10.9 BACTERIAL CONJUNCTIVITIS: ICD-10-CM

## 2025-02-21 DIAGNOSIS — D72.10 EOSINOPHILIA, UNSPECIFIED TYPE: ICD-10-CM

## 2025-02-21 DIAGNOSIS — Z86.19 FREQUENT INFECTIONS: ICD-10-CM

## 2025-02-21 DIAGNOSIS — R79.89 ELEVATED FERRITIN: ICD-10-CM

## 2025-02-21 PROCEDURE — 99214 OFFICE O/P EST MOD 30 MIN: CPT | Performed by: STUDENT IN AN ORGANIZED HEALTH CARE EDUCATION/TRAINING PROGRAM

## 2025-02-21 PROCEDURE — 3078F DIAST BP <80 MM HG: CPT | Performed by: STUDENT IN AN ORGANIZED HEALTH CARE EDUCATION/TRAINING PROGRAM

## 2025-02-21 PROCEDURE — 3074F SYST BP LT 130 MM HG: CPT | Performed by: STUDENT IN AN ORGANIZED HEALTH CARE EDUCATION/TRAINING PROGRAM

## 2025-02-21 RX ORDER — POLYMYXIN B SULFATE AND TRIMETHOPRIM 1; 10000 MG/ML; [USP'U]/ML
1 SOLUTION OPHTHALMIC EVERY 4 HOURS
Qty: 10 ML | Refills: 0 | Status: SHIPPED | OUTPATIENT
Start: 2025-02-21

## 2025-02-21 ASSESSMENT — FIBROSIS 4 INDEX: FIB4 SCORE: 0.57

## 2025-02-21 NOTE — ASSESSMENT & PLAN NOTE
Elevated total protein and globulin on labs.  Normal liver enzymes, but with elevated ferritin possibility of liver involvement.  SPEP completely normal and reassuring  Unclear etiology, will monitor on repeat labs in 3 months to monitor ferritin and iron studies to see if this is transient or something else to look more into

## 2025-02-21 NOTE — ASSESSMENT & PLAN NOTE
Likely acute phase reactants since he has been having these frequent infections/fevers and also had some mildly elevated CRP.  Overall not totally consistent with inflammatory as ESR was negative, but other acute phase such as platelet were also normal; however, iron studies and hemoglobin were completely normal, so it also does not fit a picture for hemochromatosis at this point  Total protein and globulin were also slightly elevated, so potential for something in the liver; however, liver function and liver enzymes were normal  At this point pursuing evaluation for potential immune/inflammatory disease through allergy/immunology as above, but will plan to recheck ferritin and iron studies in about 3 months to see if we need to pursue hemochromatosis evaluation

## 2025-02-21 NOTE — ASSESSMENT & PLAN NOTE
Chronic, unknown etiology.  Does currently have bacterial conjunctivitis, but other than this instance which is explained by daughters infection he has not had any unexplained infections or fevers since last visit a month ago  Reviewed labs and extensive lab evaluation for immunologic source, immune deficiency overall identified only increased inflammatory markers with mildly elevated CRP, moderately elevated ferritin that was persistent on recheck (with normal iron studies), and elevated eosinophils  Still does not fit any specific pattern for immune deficiency, but with elevated eosinophils, history of allergies, and these unexplained immune reactions I think it would be reasonable to follow-up with allergist/immunologist for evaluation and recommendations.  He is open to this-referral sent    Orders:    Referral to Allergy

## 2025-02-21 NOTE — PROGRESS NOTES
"Subjective:     CC:   Chief Complaint   Patient presents with    Follow-Up     Labs    Conjunctivitis     Redness, irritated, today         HPI:   Satnam presents today with    Problem   Eosinophilia   Elevated Ferritin   Elevated Total Protein   Frequent Infections    Concerned for frequent infections and fevers.  Recently started again in December had a fever up to 103* F, then got hand foot and mouth from daughter, after that a sore in his throat got infected.  Recovered from that then had URI 3 weeks ago that resolved, then again about 1 week ago.  Sometimes will have fever with no other symptoms for about the last 6-7 years.  During these episodes will be >101* F, and these episodes last about 2-3 days.  Takes temperatures with the forehead laser thermometers or in mouth under tongue.     Sister, cousin and aunt with autoimmune issues, no known immune deficiency in family history.    Currently with conjunctivitis infection that he got from daughter, otherwise no infections since last visit, has not had any of his unexplained fevers since last visit either.       Bacterial conjunctivitis: Concern for pinkeye.  Primarily in his left but now starting to get symptoms in his right as well.  Daughter was just diagnosed with pinkeye that resolved with antibiotic drops.  He is also experiencing discharge from both eyes and some crusting in the mornings.  Has remained afebrile, denies nasal congestion    Health Maintenance: Completed    ROS:  Per HPI, otherwise negative    Objective:     Exam:  /60 (BP Location: Left arm, Patient Position: Sitting, BP Cuff Size: Adult)   Pulse 84   Temp 36.7 °C (98 °F) (Temporal)   Ht 1.753 m (5' 9\")   Wt 73.5 kg (162 lb 0.6 oz)   SpO2 97%   BMI 23.93 kg/m²  Body mass index is 23.93 kg/m².    Physical Exam  Constitutional:       General: He is not in acute distress.     Appearance: Normal appearance. He is not ill-appearing.   HENT:      Head: Normocephalic and atraumatic. "   Eyes:      Comments: L conjunctival injection with small amount of dried discharge.  R eye with very minimal conjunctival injection, no notable discharge.   Pulmonary:      Effort: Pulmonary effort is normal. No respiratory distress.      Breath sounds: Normal breath sounds.   Musculoskeletal:      Cervical back: Normal range of motion and neck supple.   Lymphadenopathy:      Cervical: No cervical adenopathy.   Neurological:      General: No focal deficit present.      Mental Status: He is alert. Mental status is at baseline.   Psychiatric:         Mood and Affect: Mood normal.         Labs: Reviewed most recent sets of labs  Results for orders placed or performed during the hospital encounter of 02/11/25   URINALYSIS    Collection Time: 02/11/25 12:55 PM    Specimen: Urine   Result Value Ref Range    Color Yellow     Character Clear     Specific Gravity 1.008 <1.035    Ph 6.5 5.0 - 8.0    Glucose Negative Negative mg/dL    Ketones Negative Negative mg/dL    Protein Negative Negative mg/dL    Bilirubin Negative Negative    Urobilinogen, Urine 0.2 <=1.0 EU/dL    Nitrite Negative Negative    Leukocyte Esterase Negative Negative    Occult Blood Negative Negative    Micro Urine Req see below    SPEP W/REFLEX TO JIM, A, G, M    Collection Time: 02/11/25 12:55 PM   Result Value Ref Range    Albumin 5.00 3.75 - 5.01 g/dL    Alpha-1 Globulin 0.30 0.19 - 0.46 g/dL    Alpha-2 Globulin 0.67 0.48 - 1.05 g/dL    Beta Globulin 0.95 0.48 - 1.10 g/dL    Gamma Globulin 1.28 0.62 - 1.51 g/dL    Monoclonal Protein Not Applicable <=0.00 g/dL    Interpretation See Note     JIM Reflex Not Done     Total Protein, Serum 8.2 6.3 - 8.2 g/dL    EER Serum Prot. Electro. Reflex See Note    IRON/TOTAL IRON BIND    Collection Time: 02/11/25 12:55 PM   Result Value Ref Range    Iron 124 50 - 180 ug/dL    Total Iron Binding 263 250 - 450 ug/dL    Unsat Iron Binding 139 110 - 370 ug/dL    % Saturation 47 15 - 55 %   FERRITIN    Collection Time:  02/11/25 12:55 PM   Result Value Ref Range    Ferritin 568.0 (H) 22.0 - 322.0 ng/mL         Assessment & Plan:     29 y.o. male with the following -     Assessment & Plan  Frequent infections  Chronic, unknown etiology.  Does currently have bacterial conjunctivitis, but other than this instance which is explained by daughters infection he has not had any unexplained infections or fevers since last visit a month ago  Reviewed labs and extensive lab evaluation for immunologic source, immune deficiency overall identified only increased inflammatory markers with mildly elevated CRP, moderately elevated ferritin that was persistent on recheck (with normal iron studies), and elevated eosinophils  Still does not fit any specific pattern for immune deficiency, but with elevated eosinophils, history of allergies, and these unexplained immune reactions I think it would be reasonable to follow-up with allergist/immunologist for evaluation and recommendations.  He is open to this-referral sent    Orders:    Referral to Allergy    Fever, unknown origin  See frequent infections  Orders:    Referral to Allergy    Eosinophilia, unspecified type  See frequent infections  Orders:    Referral to Allergy    Bacterial conjunctivitis  Symptoms and exposure history consistent with bacterial conjunctivitis  Trimethoprim polymyxin eye drops, 1 drop in each eye 4 times daily for 5-7 days  Reviewed supportive care and return precautions       Elevated ferritin  Likely acute phase reactants since he has been having these frequent infections/fevers and also had some mildly elevated CRP.  Overall not totally consistent with inflammatory as ESR was negative, but other acute phase such as platelet were also normal; however, iron studies and hemoglobin were completely normal, so it also does not fit a picture for hemochromatosis at this point  Total protein and globulin were also slightly elevated, so potential for something in the liver; however,  liver function and liver enzymes were normal  At this point pursuing evaluation for potential immune/inflammatory disease through allergy/immunology as above, but will plan to recheck ferritin and iron studies in about 3 months to see if we need to pursue hemochromatosis evaluation         Elevated total protein  Elevated total protein and globulin on labs.  Normal liver enzymes, but with elevated ferritin possibility of liver involvement.  SPEP completely normal and reassuring  Unclear etiology, will monitor on repeat labs in 3 months to monitor ferritin and iron studies to see if this is transient or something else to look more into                 Return in about 3 months (around 5/21/2025).

## 2025-02-26 NOTE — Clinical Note
REFERRAL APPROVAL NOTICE         Sent on February 26, 2025                   Gael Dowling  [address]  [place] [address]                   Dear Mr. Dowling,    After a careful review of the medical information and benefit coverage, [facility] has processed your referral. See below for additional details.    If applicable, you must be actively enrolled with your insurance for coverage of the authorized service. If you have any questions regarding your coverage, please contact your insurance directly.    REFERRAL INFORMATION   Referral #:  [id number]  Referred-To Service Location    Referred-By Provider:  Allergy and Immunology    Rusty Farooq M.D.   [facility]      [address]  [place] [address]  [phone] [id number]  [facility] [address]  [phone]    Referral Start Date:  02/21/2025  Referral End Date:   02/21/2026             SCHEDULING  If you do not already have an appointment, please call [phone] to make an appointment.     MORE INFORMATION  If you do not already have a [employer] account, sign up at: [email or web address]  You can access your medical information, make appointments, see lab results, billing information, and more.  If you have questions regarding this referral, please contact  the [facility] Referrals department at:             [phone]. Monday - Friday 8:00AM - 5:00PM.     Sincerely,    [facility]

## 2025-03-07 ENCOUNTER — OFFICE VISIT (OUTPATIENT)
Dept: URGENT CARE | Facility: PHYSICIAN GROUP | Age: 30
End: 2025-03-07
Payer: COMMERCIAL

## 2025-03-07 VITALS
TEMPERATURE: 98.2 F | RESPIRATION RATE: 12 BRPM | HEART RATE: 107 BPM | DIASTOLIC BLOOD PRESSURE: 72 MMHG | HEIGHT: 69 IN | WEIGHT: 160 LBS | BODY MASS INDEX: 23.7 KG/M2 | OXYGEN SATURATION: 98 % | SYSTOLIC BLOOD PRESSURE: 112 MMHG

## 2025-03-07 DIAGNOSIS — J06.9 URI WITH COUGH AND CONGESTION: ICD-10-CM

## 2025-03-07 DIAGNOSIS — H66.001 NON-RECURRENT ACUTE SUPPURATIVE OTITIS MEDIA OF RIGHT EAR WITHOUT SPONTANEOUS RUPTURE OF TYMPANIC MEMBRANE: ICD-10-CM

## 2025-03-07 PROCEDURE — 99213 OFFICE O/P EST LOW 20 MIN: CPT | Performed by: NURSE PRACTITIONER

## 2025-03-07 PROCEDURE — 3074F SYST BP LT 130 MM HG: CPT | Performed by: NURSE PRACTITIONER

## 2025-03-07 PROCEDURE — 3078F DIAST BP <80 MM HG: CPT | Performed by: NURSE PRACTITIONER

## 2025-03-07 ASSESSMENT — VISUAL ACUITY: OU: 1

## 2025-03-07 ASSESSMENT — FIBROSIS 4 INDEX: FIB4 SCORE: 0.57

## 2025-03-07 ASSESSMENT — ENCOUNTER SYMPTOMS: COUGH: 1

## 2025-03-07 NOTE — PROGRESS NOTES
Subjective:     Satnam Dowling is a 29 y.o. male who presents for Cough (Right Ear ache, congestion/X 1 week )       Cough  This is a new problem. The problem has been gradually worsening. Associated symptoms include ear pain.   Otalgia   There is pain in the right ear. This is a new problem. The problem has been gradually worsening. Associated symptoms include coughing.     Ambient listening software ([employer]ana) used during this encounter with the consent of the patient. The following text is AI-assisted:    History of Present Illness  The patient presents with a cough and right ear pain.    Cough and Associated Symptoms  He developed cold symptoms on Thursday after his wife was ill. By Monday, he had a productive cough, occasional shortness of breath, and significant morning phlegm. No fevers.  - Onset: Developed cold symptoms on Thursday.  - Duration: Symptoms progressed by Monday.  - Character: Productive cough, occasional shortness of breath, significant morning phlegm.  - Timing: Symptoms worsened by Monday.  - Severity: No fevers reported.    Right Ear Pain and Headaches  His primary concern is an earache with a sensation of pressure. He also has headaches attributed to coughing, without head tenderness or pressure.  - Onset: Concurrent with cold symptoms.  - Location: Right ear.  - Character: Earache with a sensation of pressure, headaches attributed to coughing.  - Severity: No head tenderness or pressure.    ALLERGIES  - No known allergies.    Wife recently dx with ear infection. Patient's recent ear pain concerns him.    Review of Systems   HENT:  Positive for ear pain.    Respiratory:  Positive for cough.    All other systems reviewed and are negative.  Refer to HPI for additional details.    During this visit, appropriate PPE was worn, and hand hygiene was performed.    PMH:  has no past medical history on file.    MEDS:   Current Outpatient Medications:     amoxicillin-clavulanate (AUGMENTIN) 875-125  "MG Tab, Take 1 Tablet by mouth 2 times a day for 7 days., Disp: 14 Tablet, Rfl: 0    polymixin-trimethoprim (POLYTRIM) 21009-6.1 UNIT/ML-% Solution, Administer 1 Drop into both eyes every 4 hours., Disp: 10 mL, Rfl: 0    ALLERGIES:   Allergies   Allergen Reactions    Peanut-Derived Anaphylaxis    Tree Nuts Food Allergy Anaphylaxis     Peanuts      SURGHX:   Past Surgical History:   Procedure Laterality Date    TONSILLECTOMY       SOCHX:  reports that he has never smoked. He has never used smokeless tobacco. He reports current alcohol use of about 1.2 oz of alcohol per week. He reports that he does not use drugs.    FH: Per HPI as applicable/pertinent.      Objective:     /72   Pulse (!) 107   Temp 36.8 °C (98.2 °F) (Temporal)   Resp 12   Ht 1.753 m (5' 9\")   Wt 72.6 kg (160 lb)   SpO2 98%   BMI 23.63 kg/m²     Physical Exam  Nursing note reviewed.   Constitutional:       General: He is not in acute distress.     Appearance: He is well-developed. He is not ill-appearing or toxic-appearing.   HENT:      Right Ear: Tympanic membrane is injected and bulging (Dull).      Left Ear: Tympanic membrane is not perforated, erythematous or bulging.      Nose: Nose normal.      Mouth/Throat:      Mouth: Mucous membranes are moist.      Pharynx: Oropharynx is clear. Posterior oropharyngeal erythema present.   Eyes:      General: Vision grossly intact.      Extraocular Movements: Extraocular movements intact.      Conjunctiva/sclera: Conjunctivae normal.   Neck:      Trachea: Phonation normal.   Cardiovascular:      Rate and Rhythm: Regular rhythm. Tachycardia present.      Heart sounds: Normal heart sounds.   Pulmonary:      Effort: Pulmonary effort is normal. No respiratory distress.      Breath sounds: Normal breath sounds. No stridor. No decreased breath sounds, wheezing, rhonchi or rales.   Musculoskeletal:         General: No deformity. Normal range of motion.      Cervical back: Normal range of motion. "   Skin:     General: Skin is warm and dry.      Coloration: Skin is not pale.   Neurological:      Mental Status: He is alert and oriented to person, place, and time.      Motor: No weakness.   Psychiatric:         Behavior: Behavior normal. Behavior is cooperative.       Assessment/Plan:     1. Non-recurrent acute suppurative otitis media of right ear without spontaneous rupture of tympanic membrane  - amoxicillin-clavulanate (AUGMENTIN) 875-125 MG Tab; Take 1 Tablet by mouth 2 times a day for 7 days.  Dispense: 14 Tablet; Refill: 0    2. URI with cough and congestion    Rx as above sent electronically for early superimposed AOM.    Discussed likely underlying self-limiting viral etiology and expected course and duration of illness. Advised that most respiratory viral illnesses resolve on their own in 7-10 days. Defer viral PCR testing due to duration of symptoms.    Emphasize supportive measures, rest, fluids, and symptom management with over-the-counter medication as needed such as NyQuil.    Monitor. Return precautions advised.     Differential diagnosis, natural history, supportive care, over-the-counter symptom management per 's instructions, close monitoring, and indications for immediate follow-up discussed.     All questions answered. Patient agrees with the plan of care.    Discharge summary provided via [employer]

## 2025-03-20 ENCOUNTER — OFFICE VISIT (OUTPATIENT)
Dept: URGENT CARE | Facility: PHYSICIAN GROUP | Age: 30
End: 2025-03-20
Payer: COMMERCIAL

## 2025-03-20 VITALS
OXYGEN SATURATION: 98 % | WEIGHT: 160.6 LBS | SYSTOLIC BLOOD PRESSURE: 100 MMHG | HEIGHT: 69 IN | HEART RATE: 96 BPM | RESPIRATION RATE: 14 BRPM | TEMPERATURE: 97.9 F | BODY MASS INDEX: 23.79 KG/M2 | DIASTOLIC BLOOD PRESSURE: 66 MMHG

## 2025-03-20 DIAGNOSIS — B96.89 ACUTE BACTERIAL SINUSITIS: ICD-10-CM

## 2025-03-20 DIAGNOSIS — J01.90 ACUTE BACTERIAL SINUSITIS: ICD-10-CM

## 2025-03-20 PROCEDURE — 99213 OFFICE O/P EST LOW 20 MIN: CPT | Performed by: NURSE PRACTITIONER

## 2025-03-20 PROCEDURE — 3074F SYST BP LT 130 MM HG: CPT | Performed by: NURSE PRACTITIONER

## 2025-03-20 PROCEDURE — 3078F DIAST BP <80 MM HG: CPT | Performed by: NURSE PRACTITIONER

## 2025-03-20 RX ORDER — DOXYCYCLINE HYCLATE 100 MG
100 TABLET ORAL 2 TIMES DAILY
Qty: 14 TABLET | Refills: 0 | Status: SHIPPED | OUTPATIENT
Start: 2025-03-20 | End: 2025-03-27

## 2025-03-20 ASSESSMENT — FIBROSIS 4 INDEX: FIB4 SCORE: 0.57

## 2025-03-21 NOTE — PROGRESS NOTES
Verbal consent was acquired by the patient to use [employer] ambient listening note generation during this visit          Chief Complaint   Patient presents with    Sinus Problem     Facial pressure, teeth pressure, stuffy nose, x 3 days.    Conjunctivitis     Bilateral eyes redness in the morning, and dried mucus.          History of Present Illness  The patient is a 29-year-old male who presents for evaluation of a sinus infection.    He suspects a sinus infection, which has been progressively worsening since its onset on Monday. He reports significant pain in his right cheek over the past few days, which has now extended to his teeth. He does not have a history of frequent sinus infections. He also reports a mild cough. His current medications include Aller-Misty and magnesium.    Prior to the sinus infection, he experienced conjunctivitis. He had previously used eye drops for a similar condition over a month ago and believes he may have been reinfected. He resumed the use of the eye drops immediately, which appears to have improved his condition, although he noted some crusting this morning.    A couple of weeks ago, he had a cold and has a history of frequent respiratory infections. He has been on antibiotics for these conditions, specifically amoxicillin, with the most recent course completed at the end of December.    MEDICATIONS  Aller-Misty, magnesium, amoxicillin (past).         ROS:    No severe shortness of breath   No cardiac like chest pain, as discussed   As otherwise stated in HPI    Medical/SX/ Social History:  Reviewed per chart    Pertinent Medications:    Current Outpatient Medications on File Prior to Visit   Medication Sig Dispense Refill    polymixin-trimethoprim (POLYTRIM) 89114-2.1 UNIT/ML-% Solution Administer 1 Drop into both eyes every 4 hours. 10 mL 0     No current facility-administered medications on file prior to visit.        Allergies:    Peanut-derived and Tree nuts food allergy      Problem list, medications, and allergies reviewed by myself today in Epic     Physical Exam:    Vitals:    03/20/25 1544   BP: 100/66   Pulse: 96   Resp: 14   Temp: 36.6 °C (97.9 °F)   SpO2: 98%             Physical Exam  Vitals and nursing note reviewed.   Constitutional:       General: He is not in acute distress.     Appearance: Normal appearance. He is not ill-appearing or toxic-appearing.   HENT:      Head: Normocephalic and atraumatic.      Right Ear: Tympanic membrane, ear canal and external ear normal.      Left Ear: Tympanic membrane, ear canal and external ear normal.      Nose: Nasal tenderness and congestion present.      Right Nostril: Occlusion present.      Left Turbinates: Enlarged and swollen.      Right Sinus: Maxillary sinus tenderness and frontal sinus tenderness present.      Mouth/Throat:      Mouth: Mucous membranes are moist.      Pharynx: Oropharynx is clear.   Eyes:      Extraocular Movements: Extraocular movements intact.      Conjunctiva/sclera: Conjunctivae normal.      Pupils: Pupils are equal, round, and reactive to light.   Cardiovascular:      Rate and Rhythm: Normal rate.      Pulses: Normal pulses.   Pulmonary:      Effort: Pulmonary effort is normal.   Musculoskeletal:         General: Normal range of motion.      Cervical back: Normal range of motion.   Skin:     General: Skin is warm.      Capillary Refill: Capillary refill takes less than 2 seconds.   Neurological:      General: No focal deficit present.      Mental Status: He is alert and oriented to person, place, and time.          Medical Decision making and plan :  I personally reviewed prior external notes and test results pertinent to today's visit. Pt is clinically stable at today's acute urgent care visit.  Patient appears nontoxic with no acute distress noted. Appropriate for outpatient care at this time.    Pleasant 29 y.o. male presented clinic with:     Assessment & Plan  1. Acute bacterial sinusitis.  Provided  patient with information on the etiology & pathogenesis of bacterial sinusitis. Recommend cool mist humidifier at home, use nasal saline wash (i.e. Nedi-Pot), may take OTC decongestant prn, and antibiotics as prescribed. Tylenol/Motrin prn HA or discomfort. RTC for fever >4d, no improvement within 48-72h, or for any other questions or concerns.     Patient has recently been treated with Augmentin x2 in the past 60 days, therefore, doxycycline was started today.        Shared decision-making was utilized with patient for treatment plan. Medication discussed included indication for use and the potential benefits and side effects. Education was provided regarding the aforementioned assessments.  Differential Diagnosis, natural history, and supportive care discussed. All of the patient's questions were answered to their satisfaction at the time of discharge. Patient was encouraged to monitor symptoms closely. Those signs and symptoms which would warrant concern and mandate seeking a higher level of service through the emergency department discussed at length.  Patient stated agreement and understanding of this plan of care.    Disposition:  Home in stable condition     Voice Recognition Disclaimer:  Portions of this document were created using voice recognition software and [employer] technology provided by [employer]. The software does have a chance of producing errors of grammar and possibly content. I have made every reasonable attempt to correct obvious errors, but there may be errors of grammar and possibly content that I did not discover before finalizing the  documentation.    DARIUS Patino.

## 2025-03-31 ENCOUNTER — HOSPITAL ENCOUNTER (OUTPATIENT)
Dept: LAB | Facility: MEDICAL CENTER | Age: 30
End: 2025-03-31
Attending: NURSE PRACTITIONER
Payer: COMMERCIAL

## 2025-03-31 LAB
ALBUMIN SERPL BCP-MCNC: 4.6 G/DL (ref 3.2–4.9)
ALP SERPL-CCNC: 98 U/L (ref 30–99)
ALT SERPL-CCNC: 24 U/L (ref 2–50)
AST SERPL-CCNC: 26 U/L (ref 12–45)
BILIRUB CONJ SERPL-MCNC: <0.2 MG/DL (ref 0.1–0.5)
BILIRUB INDIRECT SERPL-MCNC: NORMAL MG/DL (ref 0–1)
BILIRUB SERPL-MCNC: 0.4 MG/DL (ref 0.1–1.5)
PROT SERPL-MCNC: 8.1 G/DL (ref 6–8.2)

## 2025-03-31 PROCEDURE — 86765 RUBEOLA ANTIBODY: CPT

## 2025-03-31 PROCEDURE — 86355 B CELLS TOTAL COUNT: CPT

## 2025-03-31 PROCEDURE — 82787 IGG 1 2 3 OR 4 EACH: CPT | Mod: 91

## 2025-03-31 PROCEDURE — 84155 ASSAY OF PROTEIN SERUM: CPT

## 2025-03-31 PROCEDURE — 82784 ASSAY IGA/IGD/IGG/IGM EACH: CPT

## 2025-03-31 PROCEDURE — 86334 IMMUNOFIX E-PHORESIS SERUM: CPT

## 2025-03-31 PROCEDURE — 36415 COLL VENOUS BLD VENIPUNCTURE: CPT

## 2025-03-31 PROCEDURE — 86359 T CELLS TOTAL COUNT: CPT

## 2025-03-31 PROCEDURE — 84165 PROTEIN E-PHORESIS SERUM: CPT

## 2025-03-31 PROCEDURE — 86762 RUBELLA ANTIBODY: CPT

## 2025-03-31 PROCEDURE — 86360 T CELL ABSOLUTE COUNT/RATIO: CPT

## 2025-03-31 PROCEDURE — 86735 MUMPS ANTIBODY: CPT

## 2025-03-31 PROCEDURE — 86003 ALLG SPEC IGE CRUDE XTRC EA: CPT | Mod: 91

## 2025-03-31 PROCEDURE — 84156 ASSAY OF PROTEIN URINE: CPT

## 2025-03-31 PROCEDURE — 86357 NK CELLS TOTAL COUNT: CPT

## 2025-03-31 PROCEDURE — 80076 HEPATIC FUNCTION PANEL: CPT

## 2025-03-31 PROCEDURE — 82785 ASSAY OF IGE: CPT

## 2025-03-31 PROCEDURE — 86008 ALLG SPEC IGE RECOMB EA: CPT | Mod: 91

## 2025-04-01 ENCOUNTER — HOSPITAL ENCOUNTER (OUTPATIENT)
Dept: LAB | Facility: MEDICAL CENTER | Age: 30
End: 2025-04-01
Attending: NURSE PRACTITIONER
Payer: COMMERCIAL

## 2025-04-01 PROCEDURE — 85613 RUSSELL VIPER VENOM DILUTED: CPT

## 2025-04-01 PROCEDURE — 85730 THROMBOPLASTIN TIME PARTIAL: CPT

## 2025-04-01 PROCEDURE — 85520 HEPARIN ASSAY: CPT

## 2025-04-01 PROCEDURE — 85610 PROTHROMBIN TIME: CPT

## 2025-04-02 LAB
ANNOTATION COMMENT IMP: NORMAL
APTT PPP: 34.2 SEC (ref 24.7–36)
CALIF WALNUT IGE QN: 15 KU/L
CD19 CELLS NFR SPEC: 14 % (ref 6–23)
CD3 CELLS # BLD: 1686 CELLS/UL (ref 570–2400)
CD3 CELLS NFR SPEC: 78 % (ref 62–87)
CD3+CD4+ CELLS # BLD: 867 CELLS/UL (ref 430–1800)
CD3+CD4+ CELLS NFR BLD: 40 % (ref 32–64)
CD3+CD4+ CELLS/CD3+CD8+ CLL BLD: 1.29 RATIO (ref 0.8–3.9)
CD3+CD8+ CELLS # BLD: 679 CELLS/UL (ref 210–1200)
CD3+CD8+ CELLS NFR SPEC: 31 % (ref 15–46)
CD3-CD16+CD56+ CELLS # SPEC: 163 CELLS/UL (ref 78–470)
CD3-CD16+CD56+ CELLS NFR SPEC: 8 % (ref 4–26)
CELLS.CD3-CD19+ [#/VOLUME] IN BLOOD: 303 CELLS/UL (ref 91–610)
CODFISH IGE QN: 0.44 KU/L
EGG WHITE IGE QN: 1.09 KU/L
HAZELNUT IGE QN: 10.3 KU/L
IGA SERPL-MCNC: 276 MG/DL (ref 68–408)
IGG SERPL-MCNC: 1292 MG/DL (ref 768–1632)
IGG1 SER-MCNC: 644 MG/DL (ref 240–1118)
IGG2 SER-MCNC: 434 MG/DL (ref 124–549)
IGG3 SER-MCNC: 58 MG/DL (ref 21–134)
IGG4 SER-MCNC: 115 MG/DL (ref 1–123)
IGM SERPL-MCNC: 134 MG/DL (ref 35–263)
INR PPP: 1.05 (ref 0.87–1.13)
LA PPP-IMP: NORMAL
LMWH PPP CHRO-ACNC: <0.1 U/ML
MEV IGG SER-ACNC: 39.2 AU/ML
MUV IGG SER IA-ACNC: 8.4 AU/ML
PROTHROMBIN TIME: 13.7 SEC (ref 12–14.6)
RUBV AB SER QL: 52.5 IU/ML
SALMON IGE QN: 0.64 KU/L
SCALLOP IGE QN: 0.23 KU/L
SCREEN DRVVT: 47.7 SEC (ref 28–48)
SESAME SEED IGE QN: 8.02 KU/L
SHRIMP IGE QN: 0.22 KU/L
SOYBEAN IGE QN: 15.1 KU/L
TUNA IGE QN: 0.52 KU/L
WHEAT IGE QN: 0.64 KU/L

## 2025-04-03 LAB
A ALTERNATA IGE QN: 9.71 KU/L
A FUMIGATUS IGE QN: 0.25 KU/L
ALBUMIN SERPL ELPH-MCNC: 4.63 G/DL (ref 3.75–5.01)
ALLERGEN, ARA H 6 SEVERE PEANUT Q0585: >100 KU/L
ALMOND IGE QN: 4.5 KU/L
ALPHA1 GLOB SERPL ELPH-MCNC: 0.34 G/DL (ref 0.19–0.46)
ALPHA2 GLOB SERPL ELPH-MCNC: 0.72 G/DL (ref 0.48–1.05)
ANNOTATION COMMENT IMP: ABNORMAL
B-GLOBULIN SERPL ELPH-MCNC: 0.9 G/DL (ref 0.48–1.1)
BARLEY IGE QN: 4.45 KU/L
BEEF IGE QN: 0.46 KU/L
BERMUDA GRASS IGE QN: 24 KU/L
BLUE MUSSEL IGE QN: 0.19 KU/L
BRAZIL NUT IGE QN: 6.73 KU/L
C SPHAEROSPERMUM IGE QN: 0.15 KU/L
CASHEW NUT IGE QN: 1.88 KU/L
CAT DANDER IGE QN: >100 KU/L
CHESTNUT IGE QN: 3.94 KU/L
CHICKEN FEATHER IGE QN: 0.59 KU/L
CHICKEN SERUM PROT IGE QN: 0.79 KU/L
CLAM IGE QN: 0.21 KU/L
COMMON RAGWEED IGE QN: 71.6 KU/L
CORN IGE QN: 3.75 KU/L
COTTONWOOD IGE QN: 18.3 KU/L
COW HAIR+DANDER IGE QN: 2.76 KU/L
COW MILK IGE QN: 3.56 KU/L
CRAB IGE QN: 0.32 KU/L
D FARINAE IGE QN: 0.29 KU/L
D PTERONYSS IGE QN: 0.62 KU/L
DEPRECATED MISC ALLERGEN IGE RAST QL: ABNORMAL
DOG DANDER IGE QN: 21.6 KU/L
EGG YOLK IGE QN: 1.18 KU/L
ENGL PLANTAIN IGE QN: 12.2 KU/L
GAMMA GLOB SERPL ELPH-MCNC: 1.22 G/DL (ref 0.62–1.51)
GOOSE FEATHER IGE QN: 0.47 KU/L
GOOSEFOOT IGE QN: 6.66 KU/L
HALIBUT IGE QN: 0.35 KU/L
HAZELNUT IGE QN: 10.6 KU/L
HORSE HAIR+DANDER IGE QN: 15.3 KU/L
HOUSE DUST GREER IGE QN: 32.2 KU/L
IGE SERPL-ACNC: 4059 KU/L
INTERPRETATION SERPL IFE-IMP: NORMAL
INTERPRETATION SERPL IFE-IMP: NORMAL
JOHNSON GRASS IGE QN: 61.8 KU/L
KENT BLUE GRASS IGE QN: >100 KU/L
LENTILS IGE QN: 7.92 KU/L
LOBSTER IGE QN: 0.35 KU/L
MACKEREL IGE QN: 0.29 KU/L
MESQUITE IGE QN: 8.25 KU/L
MONOCLONAL PROTEIN NL11656: NORMAL G/DL
MOUSE EPITH IGE QN: 0.21 KU/L
MOUSE URINE PROT IGE QN: 1.85 KU/L
MT JUNIPER IGE QN: 60.8 KU/L
MUGWORT IGE QN: 49.5 KU/L
OAT IGE QN: 2.73 KU/L
OLIVE POLN IGE QN: 8.49 KU/L
OYSTER IGE QN: 0.16 KU/L
P NOTATUM IGE QN: 0.27 KU/L
PATHOLOGY STUDY: ABNORMAL
PATHOLOGY STUDY: NORMAL
PEANUT (RARA H) 1 IGE QN: >100 KU/L
PEANUT (RARA H) 2 IGE QN: >100 KU/L
PEANUT (RARA H) 3 IGE QN: 41.6 KU/L
PEANUT (RARA H) 8 IGE QN: <0.1 KU/L
PEANUT (RARA H) 9 IGE QN: 0.19 KU/L
PEANUT IGE QN: >100 KU/L
PECAN/HICK NUT IGE QN: 0.37 KU/L
PER RYE GRASS IGE QN: >100 KU/L
PORK IGE QN: 0.35 KU/L
PROT SERPL-MCNC: 7.8 G/DL (ref 6.3–8.2)
RICE IGE QN: 3.79 KU/L
RYE IGE QN: 8.5 KU/L
SALTWORT IGE QN: 17 KU/L
TIMOTHY IGE QN: >100 KU/L
TROUT IGE QN: 0.64 KU/L
WALNUT IGE QN: 1.85 KU/L
WHITE ELM IGE QN: 15 KU/L
WHITE OAK IGE QN: 6.4 KU/L
WHOLE EGG IGE QN: 1.53 KU/L

## 2025-04-04 LAB
ALBUMIN 24H MFR UR ELPH: 100 %
ALPHA1 GLOB 24H MFR UR ELPH: 0 %
ALPHA2 GLOB 24H MFR UR ELPH: 0 %
B-GLOBULIN 24H MFR UR ELPH: 0 %
COLLECT DURATION TIME SPEC: NORMAL HR
EER MONOCLONAL PROTEIN STUDY, 24 HOUR U Q5964: NORMAL
GAMMA GLOB 24H MFR UR ELPH: 0 %
INTERPRETATION UR IFE-IMP: NORMAL
M PROTEIN 24H MFR UR ELPH: 0 %
M PROTEIN 24H UR ELPH-MRATE: NORMAL MG/24 HRS
PROT 24H UR-MRATE: 6 MG/DL
PROT 24H UR-MRATE: NORMAL G/(24.H) (ref 40–150)
SPECIMEN VOL ?TM UR: NORMAL ML

## 2025-05-01 DIAGNOSIS — Z86.19 FREQUENT INFECTIONS: ICD-10-CM

## 2025-05-01 DIAGNOSIS — R79.89 ELEVATED FERRITIN: ICD-10-CM

## 2025-05-01 DIAGNOSIS — R50.9 FEVER, UNKNOWN ORIGIN: ICD-10-CM

## 2025-05-19 ENCOUNTER — HOSPITAL ENCOUNTER (OUTPATIENT)
Dept: LAB | Facility: MEDICAL CENTER | Age: 30
End: 2025-05-19
Attending: STUDENT IN AN ORGANIZED HEALTH CARE EDUCATION/TRAINING PROGRAM
Payer: COMMERCIAL

## 2025-05-19 DIAGNOSIS — R50.9 FEVER, UNKNOWN ORIGIN: ICD-10-CM

## 2025-05-19 DIAGNOSIS — R79.89 ELEVATED FERRITIN: ICD-10-CM

## 2025-05-19 DIAGNOSIS — Z86.19 FREQUENT INFECTIONS: ICD-10-CM

## 2025-05-19 LAB
FERRITIN SERPL-MCNC: 463 NG/ML (ref 22–322)
IRON SATN MFR SERPL: 51 % (ref 15–55)
IRON SERPL-MCNC: 148 UG/DL (ref 50–180)
TIBC SERPL-MCNC: 289 UG/DL (ref 250–450)
UIBC SERPL-MCNC: 141 UG/DL (ref 110–370)

## 2025-05-19 PROCEDURE — 83540 ASSAY OF IRON: CPT

## 2025-05-19 PROCEDURE — 82728 ASSAY OF FERRITIN: CPT

## 2025-05-19 PROCEDURE — 36415 COLL VENOUS BLD VENIPUNCTURE: CPT

## 2025-05-19 PROCEDURE — 83550 IRON BINDING TEST: CPT

## 2025-05-20 ENCOUNTER — RESULTS FOLLOW-UP (OUTPATIENT)
Dept: MEDICAL GROUP | Facility: CLINIC | Age: 30
End: 2025-05-20

## 2025-05-23 ENCOUNTER — OFFICE VISIT (OUTPATIENT)
Dept: MEDICAL GROUP | Facility: PHYSICIAN GROUP | Age: 30
End: 2025-05-23
Payer: COMMERCIAL

## 2025-05-23 VITALS
WEIGHT: 159 LBS | HEIGHT: 69 IN | BODY MASS INDEX: 23.55 KG/M2 | DIASTOLIC BLOOD PRESSURE: 60 MMHG | HEART RATE: 83 BPM | TEMPERATURE: 97.4 F | OXYGEN SATURATION: 98 % | SYSTOLIC BLOOD PRESSURE: 100 MMHG

## 2025-05-23 DIAGNOSIS — L98.9 SKIN LESION: ICD-10-CM

## 2025-05-23 DIAGNOSIS — J45.20 MILD INTERMITTENT ASTHMA WITHOUT COMPLICATION: ICD-10-CM

## 2025-05-23 DIAGNOSIS — Z91.09 ENVIRONMENTAL ALLERGIES: Primary | ICD-10-CM

## 2025-05-23 PROCEDURE — 3078F DIAST BP <80 MM HG: CPT | Performed by: STUDENT IN AN ORGANIZED HEALTH CARE EDUCATION/TRAINING PROGRAM

## 2025-05-23 PROCEDURE — 17110 DESTRUCTION B9 LES UP TO 14: CPT | Performed by: STUDENT IN AN ORGANIZED HEALTH CARE EDUCATION/TRAINING PROGRAM

## 2025-05-23 PROCEDURE — 3074F SYST BP LT 130 MM HG: CPT | Performed by: STUDENT IN AN ORGANIZED HEALTH CARE EDUCATION/TRAINING PROGRAM

## 2025-05-23 PROCEDURE — 99213 OFFICE O/P EST LOW 20 MIN: CPT | Mod: 25 | Performed by: STUDENT IN AN ORGANIZED HEALTH CARE EDUCATION/TRAINING PROGRAM

## 2025-05-23 ASSESSMENT — FIBROSIS 4 INDEX: FIB4 SCORE: 0.55

## 2025-05-23 NOTE — PROGRESS NOTES
"Verbal consent was acquired by the patient to use [employer] ambient listening note generation during this visit     Subjective:     HPI:   History of Present Illness  The patient presents for evaluation of allergies, asthma, ingrown toenails, and skin spots.    He reports feeling well overall, we reviewed labs from allergist with extensive positive results. Immunotherapy injections for 2 months have prevented severe reactions, including skin or respiratory issues, with no side effects. No unusual fevers or illnesses since starting injections.  Happy with this POC and the improvement he has experienced.  On review of all of the food allergies with allergist, since he has eaten these foods with no significant issues they determined he is okay to continue with his diet as this and only exclude foods that seem to cause flares    Diagnosed with mild asthma as well from allergist, prescribed albuterol inhaler and anti-inflammatory medication, and has an EpiPen.     Long-standing ingrown toenails on big toes, worsening over the past decade, occasionally leading to inflammation and infection managed by soaking. Seeking treatment options, currently no active infection.    Noticed skin spots causing occasional itching on scalp.    Health Maintenance: Completed    Objective:     Exam:  /60 (BP Location: Right arm, Patient Position: Sitting, BP Cuff Size: Adult)   Pulse 83   Temp 36.3 °C (97.4 °F) (Temporal)   Ht 1.753 m (5' 9\")   Wt 72.1 kg (159 lb)   SpO2 98%   BMI 23.48 kg/m²  Body mass index is 23.48 kg/m².    Physical Exam  Constitutional:       General: He is not in acute distress.     Appearance: Normal appearance. He is not ill-appearing.   HENT:      Head: Normocephalic and atraumatic.      Nose: Nose normal. No congestion or rhinorrhea.      Mouth/Throat:      Mouth: Mucous membranes are moist.      Pharynx: Oropharynx is clear.   Eyes:      Extraocular Movements: Extraocular movements intact.      " Conjunctiva/sclera: Conjunctivae normal.   Pulmonary:      Effort: Pulmonary effort is normal. No respiratory distress.   Musculoskeletal:         General: Normal range of motion.      Cervical back: Normal range of motion and neck supple.   Skin:     General: Skin is warm and dry.      Findings: Lesion (Three cherry hemangiomas on scalp, all <2mm diameter, no surrounding erythema, nontender, no bleeding or discharge) present.      Comments: Bilateral great toe ingrowing of medial nail folds.  No active infection, erythema, pain, but very apparently ingrowing chronically.   Neurological:      General: No focal deficit present.      Mental Status: He is alert. Mental status is at baseline.   Psychiatric:         Mood and Affect: Mood normal.         Results  - Labs:    - Ferritin: Levels steadily decreasing over several months    - Iron: Normal    - Iron transfer proteins: Normal    Assessment & Plan:     1. Environmental allergies        2. Mild intermittent asthma without complication            Assessment & Plan  1. Allergies: Stable. Elevated ferritin likely due to inflammation from allergies, not iron storage issue.  - Continue immunotherapy injections through allergist.  - Reviewed dietary recommendations  - Has epipen already    2. Mild intermittent asthma:   - Prescribed albuterol inhaler and EpiPen.  - Use albuterol for wheezing, coughing, or difficulty breathing.    3. Ingrown toenails: Chronic.  - Plan to perform wedge resection procedure on one foot initially, then the other.  - Discussed procedure involves numbing toe, removing ingrown portion, and potentially using phenol to prevent regrowth.    4. Skin spots:   - Cryotherapy performed today.  - Spots may worsen before improving, becoming itchy and black before falling off in 1-2 weeks.    Follow-up  - Follow-up if spots do not resolve after initial treatment.  - Followup for procedure to remove ingrown nails          Return in about 4 weeks (around  6/20/2025) for 40 - Toenail Excision.

## 2025-05-23 NOTE — PROCEDURES
CRYOTHERAPY:  Discussed the benign nature of these lesions.     Verbal consent was obtained. Immediately prior to procedure, a time out was called to verify the correct patient, procedure, equipment, support staff and site/side marked as required. Pre-procedure pain reported as 0/10.     Location:  Lesion:    Cryotherapy performed using liquid nitrogen, freeze-thaw-freeze technique x 3.  Repeated for total 3 lesions. Patient tolerated the procedure well.  Post-procedure was 0/10. Aftercare as well as potential for blistering was discussed.  I explained that the procedure may need to be repeated if there is not complete resolution.

## 2025-06-27 ENCOUNTER — OFFICE VISIT (OUTPATIENT)
Dept: MEDICAL GROUP | Facility: PHYSICIAN GROUP | Age: 30
End: 2025-06-27
Payer: COMMERCIAL

## 2025-06-27 VITALS
DIASTOLIC BLOOD PRESSURE: 60 MMHG | HEART RATE: 88 BPM | TEMPERATURE: 97 F | WEIGHT: 164 LBS | HEIGHT: 69 IN | OXYGEN SATURATION: 96 % | SYSTOLIC BLOOD PRESSURE: 122 MMHG | BODY MASS INDEX: 24.29 KG/M2

## 2025-06-27 DIAGNOSIS — L60.0 INGROWN TOENAIL OF LEFT FOOT: Primary | ICD-10-CM

## 2025-06-27 RX ORDER — ALBUTEROL SULFATE AND BUDESONIDE 90; 80 UG/1; UG/1
AEROSOL, METERED RESPIRATORY (INHALATION)
COMMUNITY
Start: 2025-04-11

## 2025-06-27 RX ORDER — EPINEPHRINE 0.3 MG/.3ML
INJECTION SUBCUTANEOUS
COMMUNITY
Start: 2025-04-15

## 2025-06-27 ASSESSMENT — FIBROSIS 4 INDEX: FIB4 SCORE: 0.55

## 2025-06-27 NOTE — PROCEDURES
PRE-OP DIAGNOSIS: Ingrown toenail  POST-OP DIAGNOSIS: Same   PROCEDURE: toenail avulsion - wedge excision  Performing Physician: Rusty Farooq M.D.      PROCEDURE:   The area surrounding the skin lesion was prepared in the usual sterile manner. The patient is placed in the supine position, with  the knees flexed (foot flat on the table).    The toe was prepped with povidone-iodine solution. A standard digital  block was performed, using a 3-mL syringe and a 27-gauge needle. 1mL lidocaine without epinephrine was injected on either side of the toe and the superior aspect of the toe.  An additional 0.5mL was injected right at the medial base of the toenail where the impaction is. Waited 5-10 minutes for numbing to take effect.    The toe was rewashed with surgical solution. A nail elevator was slid under the cuticle to separate the medial nail plate from the overlying proximal nail fold. Nippers were used to split the nail ~1/4 of the medial nail.  The medial side of the nail was rolled over to remove wedge completely from the toe.  Remaining 3/4 of toenail left intact. Vaseline impregnated gauze was applied followed by a bulky gauze dressing.     Follow-up: The patient tolerated the procedure well without complications. Standard post-procedure care is explained and return precautions are given.